# Patient Record
Sex: MALE | Race: WHITE | Employment: FULL TIME | URBAN - METROPOLITAN AREA
[De-identification: names, ages, dates, MRNs, and addresses within clinical notes are randomized per-mention and may not be internally consistent; named-entity substitution may affect disease eponyms.]

---

## 2017-02-09 RX ORDER — AMIODARONE HYDROCHLORIDE 200 MG/1
TABLET ORAL
Qty: 90 TABLET | Refills: 2 | Status: SHIPPED | OUTPATIENT
Start: 2017-02-09 | End: 2017-04-25 | Stop reason: ALTCHOICE

## 2017-04-21 DIAGNOSIS — Z95.0 PRESENCE OF CARDIAC PACEMAKER: ICD-10-CM

## 2017-04-21 DIAGNOSIS — I25.10 CORONARY ARTERY DISEASE INVOLVING NATIVE CORONARY ARTERY OF NATIVE HEART WITHOUT ANGINA PECTORIS: ICD-10-CM

## 2017-04-21 DIAGNOSIS — E11.9 TYPE 2 DIABETES MELLITUS WITHOUT COMPLICATION, WITHOUT LONG-TERM CURRENT USE OF INSULIN (HCC): ICD-10-CM

## 2017-04-21 DIAGNOSIS — G47.30 SLEEP APNEA, UNSPECIFIED TYPE: ICD-10-CM

## 2017-04-21 DIAGNOSIS — I25.2 MI, OLD: ICD-10-CM

## 2017-04-21 DIAGNOSIS — I34.0 MITRAL VALVE INSUFFICIENCY, UNSPECIFIED ETIOLOGY: ICD-10-CM

## 2017-04-21 DIAGNOSIS — I10 ESSENTIAL HYPERTENSION: ICD-10-CM

## 2017-04-21 DIAGNOSIS — E78.00 PURE HYPERCHOLESTEROLEMIA: ICD-10-CM

## 2017-04-24 RX ORDER — SPIRONOLACTONE 25 MG/1
TABLET ORAL
Qty: 90 TABLET | Refills: 2 | Status: SHIPPED | OUTPATIENT
Start: 2017-04-24 | End: 2017-11-28 | Stop reason: SDUPTHER

## 2017-04-24 RX ORDER — ATENOLOL 25 MG/1
TABLET ORAL
Qty: 90 TABLET | Refills: 2 | Status: SHIPPED | OUTPATIENT
Start: 2017-04-24 | End: 2018-04-29 | Stop reason: SDUPTHER

## 2017-04-25 ENCOUNTER — OFFICE VISIT (OUTPATIENT)
Dept: CARDIOLOGY CLINIC | Age: 67
End: 2017-04-25
Payer: COMMERCIAL

## 2017-04-25 VITALS
HEART RATE: 66 BPM | BODY MASS INDEX: 43.79 KG/M2 | OXYGEN SATURATION: 96 % | DIASTOLIC BLOOD PRESSURE: 70 MMHG | WEIGHT: 288 LBS | SYSTOLIC BLOOD PRESSURE: 110 MMHG

## 2017-04-25 DIAGNOSIS — E11.9 TYPE 2 DIABETES MELLITUS WITHOUT COMPLICATION, WITHOUT LONG-TERM CURRENT USE OF INSULIN (HCC): ICD-10-CM

## 2017-04-25 DIAGNOSIS — E78.00 PURE HYPERCHOLESTEROLEMIA: ICD-10-CM

## 2017-04-25 DIAGNOSIS — I10 ESSENTIAL HYPERTENSION: ICD-10-CM

## 2017-04-25 DIAGNOSIS — Z95.0 PRESENCE OF CARDIAC PACEMAKER: ICD-10-CM

## 2017-04-25 DIAGNOSIS — I25.10 CORONARY ARTERY DISEASE INVOLVING NATIVE CORONARY ARTERY OF NATIVE HEART WITHOUT ANGINA PECTORIS: Primary | ICD-10-CM

## 2017-04-25 DIAGNOSIS — E55.9 VITAMIN D DEFICIENCY DISEASE: ICD-10-CM

## 2017-04-25 PROCEDURE — 99214 OFFICE O/P EST MOD 30 MIN: CPT | Performed by: INTERNAL MEDICINE

## 2017-04-25 PROCEDURE — 93289 INTERROG DEVICE EVAL HEART: CPT | Performed by: INTERNAL MEDICINE

## 2017-04-25 RX ORDER — VITAMIN B COMPLEX
TABLET ORAL
COMMUNITY
End: 2019-01-21 | Stop reason: ALTCHOICE

## 2017-04-25 RX ORDER — AMIODARONE HYDROCHLORIDE 200 MG/1
200 TABLET ORAL DAILY
COMMUNITY
End: 2017-11-28 | Stop reason: SDUPTHER

## 2017-08-09 ENCOUNTER — TELEPHONE (OUTPATIENT)
Dept: CARDIOLOGY CLINIC | Age: 67
End: 2017-08-09

## 2017-10-02 ENCOUNTER — TELEPHONE (OUTPATIENT)
Dept: CARDIOLOGY CLINIC | Age: 67
End: 2017-10-02

## 2017-10-02 NOTE — TELEPHONE ENCOUNTER
Pt was awakened on Friday, Sept 29 with ow BP & elevated HR  with slight chest pressure. Didn't present like his previous heart attack. Took 2 aspirins at onset and then took regulard meds. Would like you to tell Dr travis call him if he should do anything or if he should send reading on defibrilator.     9/29 5:45 a.m. 109/69                5:50 a.m. 109/79                6:25 a.m. 113/78         Took meds          7:15 a.m.  96/52       HR 60          7:41 a.m. 100/47      HR 63

## 2017-10-09 RX ORDER — CLOPIDOGREL BISULFATE 75 MG/1
TABLET ORAL
Qty: 90 TABLET | Refills: 2 | Status: SHIPPED | OUTPATIENT
Start: 2017-10-09 | End: 2018-05-13 | Stop reason: SDUPTHER

## 2017-10-09 RX ORDER — ATORVASTATIN CALCIUM 80 MG/1
TABLET, FILM COATED ORAL
Qty: 90 TABLET | Refills: 2 | Status: SHIPPED | OUTPATIENT
Start: 2017-10-09 | End: 2018-05-13 | Stop reason: SDUPTHER

## 2017-11-27 DIAGNOSIS — I10 ESSENTIAL HYPERTENSION: ICD-10-CM

## 2017-11-27 DIAGNOSIS — E11.9 TYPE 2 DIABETES MELLITUS WITHOUT COMPLICATION, WITHOUT LONG-TERM CURRENT USE OF INSULIN (HCC): ICD-10-CM

## 2017-11-27 DIAGNOSIS — E55.9 VITAMIN D DEFICIENCY DISEASE: ICD-10-CM

## 2017-11-27 DIAGNOSIS — I25.10 CORONARY ARTERY DISEASE INVOLVING NATIVE CORONARY ARTERY OF NATIVE HEART WITHOUT ANGINA PECTORIS: ICD-10-CM

## 2017-11-27 DIAGNOSIS — Z95.0 PRESENCE OF CARDIAC PACEMAKER: ICD-10-CM

## 2017-11-27 DIAGNOSIS — E78.00 PURE HYPERCHOLESTEROLEMIA: ICD-10-CM

## 2017-11-28 ENCOUNTER — HOSPITAL ENCOUNTER (OUTPATIENT)
Age: 67
Discharge: HOME | End: 2017-11-28
Payer: COMMERCIAL

## 2017-11-28 ENCOUNTER — OFFICE VISIT (OUTPATIENT)
Dept: CARDIOLOGY CLINIC | Age: 67
End: 2017-11-28
Payer: COMMERCIAL

## 2017-11-28 VITALS
HEART RATE: 67 BPM | BODY MASS INDEX: 43.94 KG/M2 | SYSTOLIC BLOOD PRESSURE: 140 MMHG | OXYGEN SATURATION: 98 % | WEIGHT: 289 LBS | DIASTOLIC BLOOD PRESSURE: 80 MMHG

## 2017-11-28 DIAGNOSIS — E78.00 PURE HYPERCHOLESTEROLEMIA: ICD-10-CM

## 2017-11-28 DIAGNOSIS — Z95.0 PRESENCE OF CARDIAC PACEMAKER: ICD-10-CM

## 2017-11-28 DIAGNOSIS — I25.10 CORONARY ARTERY DISEASE INVOLVING NATIVE CORONARY ARTERY OF NATIVE HEART WITHOUT ANGINA PECTORIS: ICD-10-CM

## 2017-11-28 DIAGNOSIS — E11.9 TYPE 2 DIABETES MELLITUS WITHOUT COMPLICATION, WITHOUT LONG-TERM CURRENT USE OF INSULIN (HCC): ICD-10-CM

## 2017-11-28 DIAGNOSIS — I10 ESSENTIAL HYPERTENSION: ICD-10-CM

## 2017-11-28 DIAGNOSIS — E55.9 VITAMIN D DEFICIENCY DISEASE: ICD-10-CM

## 2017-11-28 DIAGNOSIS — I50.9 CONGESTIVE HEART FAILURE, UNSPECIFIED CONGESTIVE HEART FAILURE CHRONICITY, UNSPECIFIED CONGESTIVE HEART FAILURE TYPE: Primary | ICD-10-CM

## 2017-11-28 LAB
EKG ATRIAL RATE: 61 BPM
EKG P AXIS: 62 DEGREES
EKG P-R INTERVAL: 180 MS
EKG Q-T INTERVAL: 480 MS
EKG QRS DURATION: 156 MS
EKG QTC CALCULATION (BAZETT): 483 MS
EKG R AXIS: 83 DEGREES
EKG T AXIS: -74 DEGREES
EKG VENTRICULAR RATE: 61 BPM

## 2017-11-28 PROCEDURE — 99214 OFFICE O/P EST MOD 30 MIN: CPT | Performed by: INTERNAL MEDICINE

## 2017-11-28 PROCEDURE — 93005 ELECTROCARDIOGRAM TRACING: CPT

## 2017-11-28 RX ORDER — SILDENAFIL 100 MG/1
100 TABLET, FILM COATED ORAL PRN
Qty: 10 TABLET | Refills: 5 | Status: SHIPPED | OUTPATIENT
Start: 2017-11-28 | End: 2021-08-10 | Stop reason: SDUPTHER

## 2017-11-28 RX ORDER — SPIRONOLACTONE 25 MG/1
TABLET ORAL
Qty: 90 TABLET | Refills: 3 | Status: SHIPPED | OUTPATIENT
Start: 2017-11-28 | End: 2018-09-28 | Stop reason: SDUPTHER

## 2017-11-28 RX ORDER — AMIODARONE HYDROCHLORIDE 200 MG/1
200 TABLET ORAL DAILY
Qty: 90 TABLET | Refills: 3 | Status: SHIPPED | OUTPATIENT
Start: 2017-11-28 | End: 2018-11-23 | Stop reason: SDUPTHER

## 2017-11-28 NOTE — PROGRESS NOTES
Ov DR Shaka Puentes 6 mth follow up  Sob bending over no change  No chest pain  No hospitalizations or procedures   Since seen  ICD  checked per medtronic  Today  No episodes since episode   Sept 29th called in on oct 2 with  bp and hr.    Appointed  again this year for   6 year term  Last term unable to do after age 78  Bedside echo done  Will see in 6 mths

## 2017-12-05 NOTE — PROGRESS NOTES
his amiodarone to 6 tablets weekly rather than 5 tablets weekly. We interrogated his ICD again today and he has had no further episodes of ventricular tachycardia, with no interventions done since I last saw him. He continues to have shortness of breath, which is felt to be deconditioning as well as his COPD. He has had no syncope or near syncope. No lightheadedness or dizziness. He denies any exertional chest pain. He uses a treadmill several times per week. He is pleased that he was re-elected as  for _____ [place] in 2017, which will be his last term as he turns 69 within the next 5 years. CARDIAC RISK FACTORS:  Known CAD:  Positive. Hypertension:  Positive. Hyperlipidemia:  Positive. Peripheral Vascular Disease:  Negative. Smoking:  Negative. Diabetes:  Positive. Other Family Members:  Positive. MEDICATIONS AT HOME:  He is currently on Cordarone 200 mg 6 days per week, aspirin 81 mg daily, Tenormin 25 mg half a tablet daily, Lipitor 80 mg daily, Plavix 75 mg daily, CoQ10 daily, Advair 1 puff q. 12 hours, Amaryl 1 mg b.i.d., fish oil 1000 mg daily, he takes Altace 2.5 mg daily, Aldactone 25 mg daily, Ellipta 1 puff daily, and vitamin D 1000 mg daily. PAST MEDICAL HISTORY:  Type 2 diabetes; severe sleep apnea, on a CPAP mask; nephrolithiasis; hypertension; hyperlipidemia; mitral regurgitation, EF was then in the 40% range. FAMILY HISTORY:  Mother  at 68 of Alzheimer's. Father  of MI in an accident. SOCIAL HISTORY:  He is 75years old. He is a  in [place], re-elected in 2017, which will be his last term as he cannot be re-elected after he is 78years old. . He has 2 daughters, who live in [place]. He is exercising on a treadmill 1 time per week. He has a 32-foot boat, named [employer], which is in [place] with a boat condo for the winter. He goes up once or twice a month.   He plans on retiring after his  job is done in  of life, with approximately 9.8 years left on a battery. It was implanted. Generator change was done on 12/30/2014, which is an Evera XT. His lower rate was set at 50 in VVI mode. He has had no therapies and no nonsustained ventricular tachycardia. He is pacing 16% of the time. An echocardiogram on 09/20/2016, showed an EF of 40%, with normal right ventricular size and function done at [facility]. IMPRESSION:  1. Severe CAD. 2.  Anterior myocardial infarction in 10/1999, with catheterization showing triple-vessel disease. 3.  Open heart surgery by Dr. Gary Cummings on 12/25/1999, with a radial bypass graft to the PDA and posterior ventricular branch of the right coronary artery, a free right internal mammary artery bypass graft to the diagonal and 2 branches of the circumflex and a left internal mammary to the LAD. 4.  Catheterization on 11/02/2007, after an abnormal stress test, has showed a patent LIMA to the LAD, a patent right internal mammary artery to the diagonal and 2 branches of the circumflex, with an occluded radial bypass graft to the 2 branches to the right coronary artery, with an occluded native right coronary artery, EF of 30% to 35%. 5.  ICD implanted on 11/26/2007, by Dr. Troy Michelle. 6.  Sustained ventricular tachycardia on 12/02/2014, with recommendation to be on long-term amiodarone by Dr. Jann Gamble. 7.  ICD changed on 12/2014, placing a Medtronic Evera XT ICD by Dr. Jann Gamble. 8.  Repeat catheterization by myself on 03/04/2015, showed an occluded radial artery bypass graft to the right coronary artery, with an occluded native right coronary artery. He had a patent left internal mammary to the LAD, and a patent free right internal mammary artery bypass graft to the diagonal and 2 branches of the circumflex, with an EF of 30% to 35%, medical therapy recommended. 9.  EF of 40% by an echocardiogram on 04/24/2017. 10. Moderate COPD. 11.  Hyperlipidemia, well controlled.   12.

## 2018-03-01 RX ORDER — RAMIPRIL 2.5 MG/1
2.5 CAPSULE ORAL DAILY
Qty: 30 CAPSULE | Refills: 3 | Status: SHIPPED | OUTPATIENT
Start: 2018-03-01 | End: 2019-12-11 | Stop reason: SDUPTHER

## 2018-03-01 RX ORDER — RAMIPRIL 2.5 MG/1
2.5 CAPSULE ORAL DAILY
Qty: 90 CAPSULE | Refills: 3 | Status: SHIPPED | OUTPATIENT
Start: 2018-03-01 | End: 2018-03-01 | Stop reason: SDUPTHER

## 2018-03-01 NOTE — TELEPHONE ENCOUNTER
Needs to stay on the Ramipril 2.5mg qd and not  The 5mg qod.  Per Dr. Rosalind Rojas  90/3 to express  30/3 to ra/ [address]

## 2018-03-28 ENCOUNTER — PROCEDURE VISIT (OUTPATIENT)
Dept: CARDIOLOGY CLINIC | Age: 68
End: 2018-03-28
Payer: COMMERCIAL

## 2018-03-28 DIAGNOSIS — Z95.0 PRESENCE OF CARDIAC PACEMAKER: ICD-10-CM

## 2018-03-28 PROCEDURE — 93294 REM INTERROG EVL PM/LDLS PM: CPT | Performed by: INTERNAL MEDICINE

## 2018-03-28 PROCEDURE — 93296 REM INTERROG EVL PM/IDS: CPT | Performed by: INTERNAL MEDICINE

## 2018-03-28 NOTE — PROGRESS NOTES
Subjective:      Patient ID: Denys Baeza is a 75 y.o. male. HPI    Review of Systems    Objective:   Physical Exam    Assessment:          Plan:     Denys Christopher. sent in medtronic carelink pacemaker report. Reviewed by myself and Dr Josiah Frederick.

## 2018-04-30 RX ORDER — ATENOLOL 25 MG/1
TABLET ORAL
Qty: 90 TABLET | Refills: 2 | Status: SHIPPED | OUTPATIENT
Start: 2018-04-30 | End: 2019-01-21 | Stop reason: ALTCHOICE

## 2018-05-14 RX ORDER — CLOPIDOGREL BISULFATE 75 MG/1
TABLET ORAL
Qty: 90 TABLET | Refills: 2 | Status: SHIPPED | OUTPATIENT
Start: 2018-05-14 | End: 2018-08-10 | Stop reason: ALTCHOICE

## 2018-05-14 RX ORDER — ATORVASTATIN CALCIUM 80 MG/1
TABLET, FILM COATED ORAL
Qty: 90 TABLET | Refills: 2 | Status: SHIPPED | OUTPATIENT
Start: 2018-05-14 | End: 2019-02-24 | Stop reason: SDUPTHER

## 2018-06-28 PROCEDURE — 93295 DEV INTERROG REMOTE 1/2/MLT: CPT | Performed by: INTERNAL MEDICINE

## 2018-06-28 PROCEDURE — 93296 REM INTERROG EVL PM/IDS: CPT | Performed by: INTERNAL MEDICINE

## 2018-07-05 ENCOUNTER — PROCEDURE VISIT (OUTPATIENT)
Dept: CARDIOLOGY CLINIC | Age: 68
End: 2018-07-05
Payer: COMMERCIAL

## 2018-07-05 DIAGNOSIS — Z95.0 PRESENCE OF CARDIAC PACEMAKER: ICD-10-CM

## 2018-08-09 ENCOUNTER — HOSPITAL ENCOUNTER (OUTPATIENT)
Dept: GENERAL RADIOLOGY | Age: 68
Discharge: HOME | End: 2018-08-11
Payer: COMMERCIAL

## 2018-08-09 ENCOUNTER — HOSPITAL ENCOUNTER (OUTPATIENT)
Age: 68
Discharge: HOME | End: 2018-08-09
Payer: COMMERCIAL

## 2018-08-09 ENCOUNTER — OFFICE VISIT (OUTPATIENT)
Dept: CARDIOLOGY CLINIC | Age: 68
End: 2018-08-09
Payer: COMMERCIAL

## 2018-08-09 ENCOUNTER — HOSPITAL ENCOUNTER (OUTPATIENT)
Age: 68
Discharge: HOME | End: 2018-08-11
Payer: COMMERCIAL

## 2018-08-09 VITALS
SYSTOLIC BLOOD PRESSURE: 140 MMHG | DIASTOLIC BLOOD PRESSURE: 70 MMHG | BODY MASS INDEX: 42.12 KG/M2 | WEIGHT: 277 LBS | OXYGEN SATURATION: 98 % | HEART RATE: 72 BPM

## 2018-08-09 DIAGNOSIS — I34.0 MITRAL VALVE INSUFFICIENCY, UNSPECIFIED ETIOLOGY: Primary | ICD-10-CM

## 2018-08-09 DIAGNOSIS — I25.10 CORONARY ARTERY DISEASE INVOLVING NATIVE CORONARY ARTERY OF NATIVE HEART WITHOUT ANGINA PECTORIS: ICD-10-CM

## 2018-08-09 DIAGNOSIS — E55.9 VITAMIN D DEFICIENCY DISEASE: ICD-10-CM

## 2018-08-09 DIAGNOSIS — E78.00 PURE HYPERCHOLESTEROLEMIA: ICD-10-CM

## 2018-08-09 DIAGNOSIS — I10 ESSENTIAL HYPERTENSION: ICD-10-CM

## 2018-08-09 DIAGNOSIS — E11.9 TYPE 2 DIABETES MELLITUS WITHOUT COMPLICATION, WITHOUT LONG-TERM CURRENT USE OF INSULIN (HCC): ICD-10-CM

## 2018-08-09 DIAGNOSIS — I48.92 ATRIAL FLUTTER, UNSPECIFIED TYPE (HCC): ICD-10-CM

## 2018-08-09 DIAGNOSIS — I50.9 CONGESTIVE HEART FAILURE (HCC): ICD-10-CM

## 2018-08-09 LAB
EKG ATRIAL RATE: 64 BPM
EKG P AXIS: -78 DEGREES
EKG Q-T INTERVAL: 438 MS
EKG QRS DURATION: 116 MS
EKG QTC CALCULATION (BAZETT): 473 MS
EKG R AXIS: 65 DEGREES
EKG T AXIS: -160 DEGREES
EKG VENTRICULAR RATE: 70 BPM

## 2018-08-09 PROCEDURE — 93005 ELECTROCARDIOGRAM TRACING: CPT

## 2018-08-09 PROCEDURE — 93225 XTRNL ECG REC<48 HRS REC: CPT

## 2018-08-09 PROCEDURE — 99214 OFFICE O/P EST MOD 30 MIN: CPT | Performed by: INTERNAL MEDICINE

## 2018-08-09 PROCEDURE — 71046 X-RAY EXAM CHEST 2 VIEWS: CPT

## 2018-08-09 RX ORDER — NITROGLYCERIN 0.4 MG/1
TABLET SUBLINGUAL
Qty: 25 TABLET | Refills: 3 | Status: SHIPPED | OUTPATIENT
Start: 2018-08-09 | End: 2019-01-21 | Stop reason: SDUPTHER

## 2018-08-10 DIAGNOSIS — I25.10 CORONARY ARTERY DISEASE INVOLVING NATIVE CORONARY ARTERY OF NATIVE HEART WITHOUT ANGINA PECTORIS: ICD-10-CM

## 2018-08-10 DIAGNOSIS — I10 ESSENTIAL HYPERTENSION: ICD-10-CM

## 2018-08-10 DIAGNOSIS — I48.92 ATRIAL FLUTTER, UNSPECIFIED TYPE (HCC): ICD-10-CM

## 2018-08-10 DIAGNOSIS — E55.9 VITAMIN D DEFICIENCY DISEASE: ICD-10-CM

## 2018-08-10 DIAGNOSIS — E11.9 TYPE 2 DIABETES MELLITUS WITHOUT COMPLICATION, WITHOUT LONG-TERM CURRENT USE OF INSULIN (HCC): ICD-10-CM

## 2018-08-10 DIAGNOSIS — I50.9 CONGESTIVE HEART FAILURE (HCC): ICD-10-CM

## 2018-08-10 DIAGNOSIS — I34.0 MITRAL VALVE INSUFFICIENCY, UNSPECIFIED ETIOLOGY: ICD-10-CM

## 2018-08-10 DIAGNOSIS — E78.00 PURE HYPERCHOLESTEROLEMIA: ICD-10-CM

## 2018-08-10 NOTE — PROGRESS NOTES
Angeles Winston M.D. [address] [place] [address] [address], [place] [address]  [phone]      2018      Kali YOVANA Dee, [address] [facility], [address] [place] [address]      RE:   Anthony Postal  :  1950      Dear Dr. Katie Dee:    CHIEF COMPLAINT:  1. Coronary artery disease. 2.  Ventricular tachycardia. HISTORY OF PRESENT ILLNESS:  I had the pleasure of seeing Mr. Jasmine in our office on 2018. He is a pleasant 79-year-old [facility] in [place], who has a history of severe coronary artery disease. On 10/09/1999, he had an ST elevation myocardial infarction, was given TPA, and a catheterization showed severe triple vessel disease. He had open heart surgery by Dr. Mami Fulton on 1999, with a radial bypass graft sequentially to the PDA and posterior ventricular branch to the right coronary artery, a free right internal mammary artery bypass to the diagonal, anterior branch of the circumflex and a left internal mammary artery to the LAD. A catheterization on 2007, showed widely patent bypass grafts with an occluded posterolateral right coronary artery, EF of 30% to 35% and a single lead ICD placed was on 2007, by Dr. Juani Dobson. On 2014, he had sustained ventricular tachycardia with a shock and it was recommended that he be on long-term amiodarone by Dr. Don Alvarez. His ICD was implanted on 2014, by Dr. Don Alvarez because of LYUDMILA. An Evera Medtronic XT single  was placed. I did a catheterization on 2015, that showed an occluded radial artery bypass graft to the right coronary artery with an occluded native right coronary artery. He had a patent LIMA to the LAD and a patent free right internal mammary artery bypass graft to the diagonal and 2 branches of the circumflex with an EF of 30% to 35% with medical therapy recommended.   His EF improved to 40% by an echocardiogram on 04/24/2017. His amiodarone is at 5 days per week. However, in 04/2017, he had multiple episodes of nonsustained ventricular tachycardia, and therefore, I increased his amiodarone to 6 days per week. I last saw him on 11/28/2017, and at that time he was doing well. He has had no further episodes of nonsustained ventricular tachycardia. On 04/05/2018, during an intimate encounter, he had sustained ventricular tachycardia with defibrillation of his ICD. He converted back to regular rhythm at that time. He has had no chest pain or chest discomfort. He has continued shortness of breath secondary to COPD and some deconditioning. He has had no syncope or near syncope. No lightheaded or dizziness. He has had no exertional chest pain. He feels his energy level is slightly less over the last several months. He overall has been feeling good except for slightly less energy. He has had no hospitalizations or procedures. We interrogated his pacemaker and confirmed that he had had ventricular tachycardia on 04/05/2018, with failed burst pacing and converted with 1 single ICD shock. CARDIAC RISK FACTORS:  Known CAD:  Positive. Hypertension:  Positive. Hyperlipidemia:  Positive. Peripheral Vascular Disease:  Negative. Smoking:  Negative. Diabetes:  Positive. Other Family Members:  Positive. MEDICATIONS AT HOME:  He is currently on amiodarone 200 mg 6 days a week with skipping Sunday, vitamin C 1000 mg daily, aspirin 81 mg daily, Tenormin 25 mg daily, Lipitor 80 mg daily, Plavix 75 mg daily, CoQ10 100 mg daily, Advair 250/50 mcg 1 puff q.12h., Amaryl 1 mg b.i.d., Claritin 10 mg daily, multivitamins daily, fish oil 1000 mg daily, Altace 2.5 mg daily, Viagra 100 mg p.r.n., Aldactone 25 mg daily, Incruse Ellipta 1 puff daily, and vitamin D 1000 units daily. PAST MEDICAL HISTORY:  1. Type 2 diabetes with a hemoglobin A1c of 6.7.  2.  He had severe sleep apnea on a CPAP mask.   3. murmur. PMI was normal.  No lift, thrust, or pericardial friction rub. LUNGS:  Quite clear to auscultation and percussion. ABDOMEN:  Soft and nontender. Good bowel sounds. The aorta was not enlarged. No hepatomegaly, splenomegaly. EXTREMITIES:  Good femoral pulses. Good pedal pulses. He had trace pedal edema. Skin was warm and dry. No calf tenderness. Nail beds pink. Good cap refill. PULSES:  Bilateral symmetrical radial, brachial and carotid pulses. Good femoral and pedal pulses. NEUROLOGIC EXAM:  Within normal limits. PSYCHIATRIC EXAM:  Within normal limits. LABORATORY DATA:  From 06/28/2018, his white count was 7.1, hemoglobin 16.1 with a platelet count of 569,895. Glucose was 77, BUN 22, creatinine 1.68 with a GFR of 41, which is about the same as it had been previously. Sodium 142, potassium 4.2, calcium 9.1. AST was 20, ALT was 19. Cholesterol 127 with a triglyceride of 99, HDL was 42, LDL 65, vitamin D was 43. TSH was elevated at 15.98 with a free T4 of 1.47, magnesium was 2.2. An EKG showed what appeared to be a slow atrial flutter with fairly big regular ventricular response. It was definitely changed from his previous EKG where he was in a sinus rhythm. We repeated his EKG with a rhythm strip and indeed it did appear to show slow atrial flutter with a variable ventricular delay with an overall heart rate in the 70 beats per minute range. Bedside echocardiogram showed an EF in the 40% range with mildly dilated right-sided chambers, which we would expect with sleep apnea. IMPRESSION:  1. Hypothyroidism with a TSH of 15, most likely secondary to his amiodarone. 2.  What appears to be slow atrial flutter with a controlled ventricular response, which is new since his last EKG where he was in sinus rhythm. 3.  Severe coronary artery disease with an anterior myocardial infarction on 10/19/1999, with a catheterization showing triple vessel disease.   4.  Open heart surgery by atrial flutter, then would anticoagulate for 3 weeks and plan on a cardioversion after 3 weeks of anticoagulation. 5.  Would then consider EP evaluation with possible ablation if appropriate. 6.  Have him see Dr. Nathan Chadwick for his hypothyroidism for thyroid replacement. 7.  We will continue his amiodarone at the same dose in view of his hypothyroidism. DISCUSSION: Mr. Ltanya Boxer overall clinically is doing well. He has had no chest pain or chest discomfort, or any unusual shortness of breath. He has had mild loss of energy. He did have a ventricular tachycardia on 04/05/2018 and was defibrillated with his ICD. He has had no further episodes of ventricular tachycardia. His rhythm has changed. He appears to be in a slow atrial flutter with a controlled ventricular response. This is a definite change from his last EKG which showed sinus rhythm. Obviously, we do not know how long he has been in atrial flutter. His ICD is a single ventricular lead and therefore we do not have atrial sensing. It was difficult to get a clear idea of his rhythm on EKG and I will do a 48-hour Holter to try to get a better idea of his rhythm and rate over 48 hours. At night, his rate should slow and we should be able to see more discrete flutter waves and get a clear idea of the rate of his flutter waves. His MENDEL score is 5 and there is a high recommendation for anticoagulation. I will give him samples of a NOAC. We had Xarelto which we gave him at 20 mg daily. His renal function is good enough that he does not need to reduce dose. He does appear to have hypothyroidism. His TSH was 6.98 when we checked in 10/2017. It has increased to 15 at this time. He will call Dr. Nathan Chadwick tomorrow for recommendation concerning thyroid replacement. We will draw another TSH tomorrow. He has had one episode of ventricular tachycardia requiring cardioversion on 04/05/2018.   I will not change his dose of amiodarone based on

## 2018-09-10 ENCOUNTER — TELEPHONE (OUTPATIENT)
Dept: CARDIOLOGY CLINIC | Age: 68
End: 2018-09-10

## 2018-09-10 ENCOUNTER — HOSPITAL ENCOUNTER (OUTPATIENT)
Age: 68
Discharge: HOME | End: 2018-09-10
Payer: COMMERCIAL

## 2018-09-10 ENCOUNTER — HOSPITAL ENCOUNTER (OUTPATIENT)
Dept: NON INVASIVE DIAGNOSTICS | Age: 68
Discharge: HOME | End: 2018-09-10
Payer: COMMERCIAL

## 2018-09-10 ENCOUNTER — OFFICE VISIT (OUTPATIENT)
Dept: CARDIOLOGY CLINIC | Age: 68
End: 2018-09-10
Payer: COMMERCIAL

## 2018-09-10 VITALS
WEIGHT: 282 LBS | BODY MASS INDEX: 42.88 KG/M2 | SYSTOLIC BLOOD PRESSURE: 101 MMHG | RESPIRATION RATE: 20 BRPM | HEART RATE: 65 BPM | OXYGEN SATURATION: 99 % | DIASTOLIC BLOOD PRESSURE: 59 MMHG

## 2018-09-10 DIAGNOSIS — I48.92 ATRIAL FLUTTER, UNSPECIFIED TYPE (HCC): Primary | ICD-10-CM

## 2018-09-10 DIAGNOSIS — E11.9 TYPE 2 DIABETES MELLITUS WITHOUT COMPLICATION, WITHOUT LONG-TERM CURRENT USE OF INSULIN (HCC): ICD-10-CM

## 2018-09-10 DIAGNOSIS — I10 ESSENTIAL HYPERTENSION: ICD-10-CM

## 2018-09-10 DIAGNOSIS — I48.91 ATRIAL FIBRILLATION, CONTROLLED (HCC): Primary | ICD-10-CM

## 2018-09-10 DIAGNOSIS — I48.91 ATRIAL FIBRILLATION, CONTROLLED (HCC): ICD-10-CM

## 2018-09-10 DIAGNOSIS — E78.00 PURE HYPERCHOLESTEROLEMIA: ICD-10-CM

## 2018-09-10 DIAGNOSIS — I25.10 CORONARY ARTERY DISEASE INVOLVING NATIVE CORONARY ARTERY OF NATIVE HEART WITHOUT ANGINA PECTORIS: ICD-10-CM

## 2018-09-10 DIAGNOSIS — I48.92 ATRIAL FLUTTER, UNSPECIFIED TYPE (HCC): ICD-10-CM

## 2018-09-10 DIAGNOSIS — E55.9 VITAMIN D DEFICIENCY DISEASE: ICD-10-CM

## 2018-09-10 DIAGNOSIS — I34.0 MITRAL VALVE INSUFFICIENCY, UNSPECIFIED ETIOLOGY: ICD-10-CM

## 2018-09-10 LAB
ABSOLUTE EOS #: 0.1 K/UL (ref 0–0.4)
ABSOLUTE IMMATURE GRANULOCYTE: ABNORMAL K/UL (ref 0–0.3)
ABSOLUTE LYMPH #: 1.3 K/UL (ref 1–4.8)
ABSOLUTE MONO #: 0.7 K/UL (ref 0–1)
ALBUMIN SERPL-MCNC: 4.3 G/DL (ref 3.5–5.2)
ALBUMIN/GLOBULIN RATIO: ABNORMAL (ref 1–2.5)
ALP BLD-CCNC: 72 U/L (ref 40–129)
ALT SERPL-CCNC: 29 U/L (ref 5–41)
ANION GAP SERPL CALCULATED.3IONS-SCNC: 11 MMOL/L (ref 9–17)
AST SERPL-CCNC: 24 U/L
BASOPHILS # BLD: 1 % (ref 0–2)
BASOPHILS ABSOLUTE: 0 K/UL (ref 0–0.2)
BILIRUB SERPL-MCNC: 0.88 MG/DL (ref 0.3–1.2)
BUN BLDV-MCNC: 24 MG/DL (ref 8–23)
BUN/CREAT BLD: 16 (ref 9–20)
CALCIUM SERPL-MCNC: 9.1 MG/DL (ref 8.6–10.4)
CHLORIDE BLD-SCNC: 102 MMOL/L (ref 98–107)
CO2: 26 MMOL/L (ref 20–31)
CREAT SERPL-MCNC: 1.49 MG/DL (ref 0.7–1.2)
DIFFERENTIAL TYPE: YES
EKG ATRIAL RATE: 178 BPM
EKG Q-T INTERVAL: 424 MS
EKG QRS DURATION: 120 MS
EKG QTC CALCULATION (BAZETT): 495 MS
EKG R AXIS: 66 DEGREES
EKG T AXIS: -145 DEGREES
EKG VENTRICULAR RATE: 82 BPM
EOSINOPHILS RELATIVE PERCENT: 2 % (ref 0–5)
GFR AFRICAN AMERICAN: 57 ML/MIN
GFR NON-AFRICAN AMERICAN: 47 ML/MIN
GFR SERPL CREATININE-BSD FRML MDRD: ABNORMAL ML/MIN/{1.73_M2}
GFR SERPL CREATININE-BSD FRML MDRD: ABNORMAL ML/MIN/{1.73_M2}
GLUCOSE BLD-MCNC: 114 MG/DL (ref 70–99)
HCT VFR BLD CALC: 46 % (ref 41–53)
HEMOGLOBIN: 15 G/DL (ref 13.5–17.5)
IMMATURE GRANULOCYTES: ABNORMAL %
LYMPHOCYTES # BLD: 17 % (ref 13–44)
MCH RBC QN AUTO: 31.6 PG (ref 26–34)
MCHC RBC AUTO-ENTMCNC: 32.5 G/DL (ref 31–37)
MCV RBC AUTO: 97 FL (ref 80–100)
MONOCYTES # BLD: 10 % (ref 5–9)
NRBC AUTOMATED: ABNORMAL PER 100 WBC
PDW BLD-RTO: 15 % (ref 12.1–15.2)
PLATELET # BLD: 198 K/UL (ref 140–450)
PLATELET ESTIMATE: ABNORMAL
PMV BLD AUTO: ABNORMAL FL (ref 6–12)
POTASSIUM SERPL-SCNC: 4.4 MMOL/L (ref 3.7–5.3)
RBC # BLD: 4.74 M/UL (ref 4.5–5.9)
RBC # BLD: ABNORMAL 10*6/UL
SEG NEUTROPHILS: 70 % (ref 39–75)
SEGMENTED NEUTROPHILS ABSOLUTE COUNT: 5.1 K/UL (ref 2.1–6.5)
SODIUM BLD-SCNC: 139 MMOL/L (ref 135–144)
THYROXINE, FREE: 1.32 NG/DL (ref 0.93–1.7)
TOTAL PROTEIN: 6.8 G/DL (ref 6.4–8.3)
TSH SERPL DL<=0.05 MIU/L-ACNC: 8.35 MIU/L (ref 0.3–5)
WBC # BLD: 7.3 K/UL (ref 3.5–11)
WBC # BLD: ABNORMAL 10*3/UL

## 2018-09-10 PROCEDURE — 92960 CARDIOVERSION ELECTRIC EXT: CPT

## 2018-09-10 PROCEDURE — 93005 ELECTROCARDIOGRAM TRACING: CPT

## 2018-09-10 PROCEDURE — 2580000003 HC RX 258: Performed by: INTERNAL MEDICINE

## 2018-09-10 PROCEDURE — 84439 ASSAY OF FREE THYROXINE: CPT

## 2018-09-10 PROCEDURE — 85025 COMPLETE CBC W/AUTO DIFF WBC: CPT

## 2018-09-10 PROCEDURE — 36415 COLL VENOUS BLD VENIPUNCTURE: CPT

## 2018-09-10 PROCEDURE — 6360000002 HC RX W HCPCS: Performed by: INTERNAL MEDICINE

## 2018-09-10 PROCEDURE — 84443 ASSAY THYROID STIM HORMONE: CPT

## 2018-09-10 PROCEDURE — 80053 COMPREHEN METABOLIC PANEL: CPT

## 2018-09-10 PROCEDURE — 99212 OFFICE O/P EST SF 10 MIN: CPT | Performed by: INTERNAL MEDICINE

## 2018-09-10 RX ORDER — SODIUM CHLORIDE 0.9 % (FLUSH) 0.9 %
10 SYRINGE (ML) INJECTION PRN
Status: DISCONTINUED | OUTPATIENT
Start: 2018-09-11 | End: 2018-09-11 | Stop reason: HOSPADM

## 2018-09-10 RX ORDER — PROPOFOL 10 MG/ML
60 INJECTION, EMULSION INTRAVENOUS EVERY 5 MIN PRN
Status: DISCONTINUED | OUTPATIENT
Start: 2018-09-10 | End: 2018-09-11 | Stop reason: HOSPADM

## 2018-09-10 RX ORDER — PROPOFOL 10 MG/ML
60 INJECTION, EMULSION INTRAVENOUS ONCE
Status: DISCONTINUED | OUTPATIENT
Start: 2018-09-10 | End: 2018-09-11 | Stop reason: HOSPADM

## 2018-09-10 RX ORDER — PROPOFOL 10 MG/ML
127 INJECTION, EMULSION INTRAVENOUS ONCE
Status: DISCONTINUED | OUTPATIENT
Start: 2018-09-10 | End: 2018-09-11 | Stop reason: HOSPADM

## 2018-09-10 RX ORDER — SODIUM CHLORIDE 450 MG/100ML
INJECTION, SOLUTION INTRAVENOUS CONTINUOUS
Status: DISCONTINUED | OUTPATIENT
Start: 2018-09-10 | End: 2018-09-11 | Stop reason: HOSPADM

## 2018-09-10 RX ADMIN — PROPOFOL 100 MG: 10 INJECTION, EMULSION INTRAVENOUS at 16:02

## 2018-09-10 RX ADMIN — SODIUM CHLORIDE: 4.5 INJECTION, SOLUTION INTRAVENOUS at 15:55

## 2018-09-10 NOTE — PROGRESS NOTES
accommodation. Extraocular movements were intact. Mucous membranes were dry. NECK:  No JVD. Good carotid pulses. No carotid bruits. No lymphadenopathy or thyromegaly. CARDIOVASCULAR EXAM:  S1 and S2 were normal.  No S3 or S4. Soft systolic blowing type murmur. No diastolic murmur. PMI was normal.  No lift, thrust, or pericardial friction rub. LUNGS:  Quite clear to auscultation and percussion. ABDOMEN:  Soft and nontender. Good bowel sounds. The aorta was not enlarged. No hepatomegaly, splenomegaly. EXTREMITIES:  Good femoral pulses. Good pedal pulses. He had trace pedal edema. Skin was warm and dry. No calf tenderness. Nail beds pink. Good cap refill. PULSES:  Bilateral symmetrical radial, brachial and carotid pulses. Good femoral and pedal pulses. NEUROLOGIC EXAM:  Within normal limits. PSYCHIATRIC EXAM:  Within normal limits. LABORATORY DATA:  From 06/28/2018, his white count was 7.1, hemoglobin 16.1 with a platelet count of 908,900. Glucose was 77, BUN 22, creatinine 1.68 with a GFR of 41, which is about the same as it had been previously. Sodium 142, potassium 4.2, calcium 9.1. AST was 20, ALT was 19. Cholesterol 127 with a triglyceride of 99, HDL was 42, LDL 65, vitamin D was 43. TSH was elevated at 15.98 with a free T4 of 1.47, magnesium was 2.2. An EKG showed what appeared to be a slow atrial flutter with fairly big regular ventricular response. It was definitely changed from his previous EKG where he was in a sinus rhythm. We repeated his EKG with a rhythm strip and indeed it did appear to show slow atrial flutter with a variable ventricular delay with an overall heart rate in the 70 beats per minute range. Bedside echocardiogram showed an EF in the 40% range with mildly dilated right-sided chambers, which we would expect with sleep apnea. IMPRESSION:  1. Hypothyroidism with a TSH of 15, most likely secondary to his amiodarone.   2.  Slow atrial flutter with a cardioversion. Thank you very much for allowing me the privilege of seeing Mr. Jasmine. Any questions on my thoughts, please do not hesitate to contact me.     Sincerely,        Cruz Else

## 2018-09-10 NOTE — PROGRESS NOTES
1616.  Patient talking with Dr. Netta Baker. 1645   Patient talking. Skin warm and dry. Respirations even and unlabored.

## 2018-09-10 NOTE — PRE SEDATION
CAPS Take by mouth   Yes Historical Provider, MD   Umeclidinium Bromide (INCRUSE ELLIPTA) 62.5 MCG/INH AEPB Inhale 1 puff into the lungs daily   Yes Historical Provider, MD   glimepiride (AMARYL) 1 MG tablet Take 1 mg by mouth 2 times daily    Yes Historical Provider, MD   fluticasone-salmeterol (ADVAIR) 250-50 MCG/DOSE AEPB Inhale 1 puff into the lungs every 12 hours   Yes Historical Provider, MD   vitamin D (CHOLECALCIFEROL) 1000 UNIT TABS tablet Take 1,000 Units by mouth daily. Yes Historical Provider, MD   loratadine (CLARITIN REDITABS) 10 MG dissolvable tablet Take 10 mg by mouth daily. Yes Historical Provider, MD   Garlic 5186 MG CAPS Take  by mouth. Yes Historical Provider, MD   Multiple Vitamins-Minerals (THERAPEUTIC MULTIVITAMIN-MINERALS) tablet Take 1 tablet by mouth daily. Yes Historical Provider, MD   Omega-3 Fatty Acids (FISH OIL) 1000 MG CPDR Take  by mouth. Yes Historical Provider, MD   aspirin 81 MG tablet Take 81 mg by mouth daily. Yes Historical Provider, MD   nitroGLYCERIN (NITROSTAT) 0.4 MG SL tablet up to max of 3 total doses. If no relief after 1 dose, call 911. 8/9/18   Patricia Hogan MD   sildenafil (VIAGRA) 100 MG tablet Take 1 tablet by mouth as needed for Erectile Dysfunction 11/28/17   Patricia Hogan MD   nitroGLYCERIN (NITROSTAT) 0.4 MG SL tablet Place 0.4 mg under the tongue every 5 minutes as needed for Chest pain    Historical Provider, MD   Ascorbic Acid (VITAMIN C) 500 MG tablet Take 1,000 mg by mouth daily     Historical Provider, MD     Coumadin Use Last 7 Days:  no    Antiplatelet drug therapy use last 7 days: yes -Other anticoagulant use last 7 days: yes -   Additional Medication Information:       Pre-Sedation Documentation and Exam:   I have personally completed a history, physical exam & review of systems for this patient (see notes).     Mallampati Airway Assessment:  Mallampati Class I - (soft palate, fauces, uvula & anterior/posterior tonsillar pillars are

## 2018-09-11 LAB
EKG ATRIAL RATE: 63 BPM
EKG ATRIAL RATE: 85 BPM
EKG P AXIS: 58 DEGREES
EKG P-R INTERVAL: 180 MS
EKG Q-T INTERVAL: 412 MS
EKG Q-T INTERVAL: 450 MS
EKG QRS DURATION: 120 MS
EKG QRS DURATION: 166 MS
EKG QTC CALCULATION (BAZETT): 460 MS
EKG QTC CALCULATION (BAZETT): 484 MS
EKG R AXIS: 74 DEGREES
EKG R AXIS: 78 DEGREES
EKG T AXIS: -106 DEGREES
EKG T AXIS: -167 DEGREES
EKG VENTRICULAR RATE: 63 BPM
EKG VENTRICULAR RATE: 83 BPM

## 2018-09-13 NOTE — PROCEDURES
[facility]                              [address] [place] [address]                              CARDIAC CATHETERIZATION    PATIENT NAME: Epifanio Paulson                        :        1950  MED REC NO:   [id number]                              ROOM:  ACCOUNT NO:   [de-identified]                           ADMIT DATE: 09/10/2018  PROVIDER:     Jeronimo Franks    DATE OF PROCEDURE:  09/10/2018    NAME OF THE TEST:  Cardioversion from atrial flutter to normal sinus  rhythm. INDICATION:  Atrial flutter. Had him anticoagulated for 3 weeks. PROCEDURE:  We gave him propofol intravenously. Respiratory therapy and 2  RNs were assisting. After he was asleep, I gave him a single 30-joules  synchronized shock. He converted immediately to sinus rhythm at a rate of  50 beats per minute. He awoke without difficulty with no complications. IMPRESSION:  Successful cardioversion from atrial flutter to normal sinus  rhythm with 30 joules. DISCUSSION:  He is already on amiodarone for ventricular arrhythmias. Therefore, I will continue with same medications. He was complaining of  marked fatigue and I will see if he is symptomatically better in sinus  rhythm. If he is better in sinus rhythm, we will follow him clinically. If he would redevelop atrial flutter again then he would be a candidate for  atrial flutter ablation. At this point, if he is controlled and remains in  sinus rhythm with amiodarone then I would not send him for ablation. We  will continue anticoagulation indefinitely. Omar Mosquera    D: 2018 23:50:40       T: 2018 1:27:08     [id number]  Job#: [id number]     Doc#: [id number]    CC:  Kali Reyes

## 2018-09-28 RX ORDER — SPIRONOLACTONE 25 MG/1
TABLET ORAL
Qty: 90 TABLET | Refills: 3 | Status: SHIPPED | OUTPATIENT
Start: 2018-09-28 | End: 2020-03-09 | Stop reason: SDUPTHER

## 2018-10-08 ENCOUNTER — TELEPHONE (OUTPATIENT)
Dept: CARDIOLOGY CLINIC | Age: 68
End: 2018-10-08

## 2018-10-16 ENCOUNTER — OFFICE VISIT (OUTPATIENT)
Dept: CARDIOLOGY CLINIC | Age: 68
End: 2018-10-16
Payer: COMMERCIAL

## 2018-10-16 ENCOUNTER — HOSPITAL ENCOUNTER (OUTPATIENT)
Age: 68
Discharge: HOME | End: 2018-10-16
Payer: COMMERCIAL

## 2018-10-16 VITALS
OXYGEN SATURATION: 98 % | BODY MASS INDEX: 43.49 KG/M2 | SYSTOLIC BLOOD PRESSURE: 124 MMHG | HEART RATE: 60 BPM | WEIGHT: 286 LBS | DIASTOLIC BLOOD PRESSURE: 60 MMHG

## 2018-10-16 DIAGNOSIS — I10 ESSENTIAL HYPERTENSION: ICD-10-CM

## 2018-10-16 DIAGNOSIS — I34.0 MITRAL VALVE INSUFFICIENCY, UNSPECIFIED ETIOLOGY: ICD-10-CM

## 2018-10-16 DIAGNOSIS — E11.9 TYPE 2 DIABETES MELLITUS WITHOUT COMPLICATION, WITHOUT LONG-TERM CURRENT USE OF INSULIN (HCC): ICD-10-CM

## 2018-10-16 DIAGNOSIS — I25.10 CORONARY ARTERY DISEASE INVOLVING NATIVE CORONARY ARTERY OF NATIVE HEART WITHOUT ANGINA PECTORIS: ICD-10-CM

## 2018-10-16 DIAGNOSIS — E55.9 VITAMIN D DEFICIENCY DISEASE: ICD-10-CM

## 2018-10-16 DIAGNOSIS — E78.00 PURE HYPERCHOLESTEROLEMIA: ICD-10-CM

## 2018-10-16 DIAGNOSIS — I48.92 ATRIAL FLUTTER, UNSPECIFIED TYPE (HCC): ICD-10-CM

## 2018-10-16 LAB
EKG ATRIAL RATE: 54 BPM
EKG P AXIS: 40 DEGREES
EKG P-R INTERVAL: 186 MS
EKG Q-T INTERVAL: 482 MS
EKG QRS DURATION: 112 MS
EKG QTC CALCULATION (BAZETT): 457 MS
EKG R AXIS: 90 DEGREES
EKG T AXIS: -85 DEGREES
EKG VENTRICULAR RATE: 54 BPM

## 2018-10-16 PROCEDURE — 93005 ELECTROCARDIOGRAM TRACING: CPT

## 2018-10-16 PROCEDURE — 99212 OFFICE O/P EST SF 10 MIN: CPT | Performed by: INTERNAL MEDICINE

## 2018-10-29 NOTE — PROGRESS NOTES
2.5 mg daily, Xarelto 20 mg daily, Viagra 100 mg p.r.n., Aldactone 25 mg daily, Incruse Ellipta 1 puff daily, vitamin D 1000 units daily. PHYSICAL EXAMINATION:  VITAL SIGNS:  His blood pressure today was 120/70 with a heart rate of 60 and regular. Respiratory rate 18. GENERAL:  He is a pleasant 79-year-old gentleman. He denied pain. He was oriented to person, place and time. Answered questions appropriately. SKIN:  No unusual skin changes. HEENT:  Unremarkable. NECK:  No JVD. Good carotid pulses. No carotid bruits. CARDIOVASCULAR EXAM:  S1 and S2 were normal.  No S3 or S4.  LUNGS:  Quite clear to auscultation and percussion. ABDOMEN:  Soft and nontender. Good bowel sounds. The aorta was not enlarged. No hepatomegaly, splenomegaly. EXTREMITIES:  Good femoral pulses. Good pedal pulses. No pedal edema, although he just returned from a 10-hour driving trip from [facility]. DISCUSSION:  At this point, he is doing well. He is tolerating his NOAC and he has remained in sinus rhythm thus far. He is currently on amiodarone 6 days a week for his ventricular tachycardia. We have a return appointment in February. I made no other change in medications. At this point, he is stable. However, he is thinking about buying an iWatch to be able to monitor his rhythm at home, which should be an advantage. Thank you very much for allowing me the privilege of seeing Mr. Jasmine. If you have any questions on my thoughts, please do not hesitate to contact me.     Sincerely,        Ivelisse Morrow    D: 10/16/2018 16:15:38     T: 10/16/2018 23:28:23     [id number]  Job#: [id number]   Doc#: [id number]    CC:

## 2018-11-05 ENCOUNTER — TELEPHONE (OUTPATIENT)
Dept: CARDIOLOGY CLINIC | Age: 68
End: 2018-11-05

## 2018-11-05 DIAGNOSIS — I25.10 CORONARY ARTERY DISEASE INVOLVING NATIVE CORONARY ARTERY OF NATIVE HEART WITHOUT ANGINA PECTORIS: ICD-10-CM

## 2018-11-05 DIAGNOSIS — I10 ESSENTIAL HYPERTENSION: Primary | ICD-10-CM

## 2018-11-05 LAB
ALBUMIN SERPL-MCNC: 3.7 G/DL
ALP BLD-CCNC: 77 U/L
ALT SERPL-CCNC: 31 U/L
ANION GAP SERPL CALCULATED.3IONS-SCNC: ABNORMAL MMOL/L
AST SERPL-CCNC: 20 U/L
BASOPHILS ABSOLUTE: 0 /ΜL
BASOPHILS RELATIVE PERCENT: 0.5 %
BILIRUB SERPL-MCNC: 0.8 MG/DL (ref 0.1–1.4)
BUN BLDV-MCNC: 22 MG/DL
CALCIUM SERPL-MCNC: 8.8 MG/DL
CHLORIDE BLD-SCNC: 105 MMOL/L
CO2: 28 MMOL/L
CREAT SERPL-MCNC: 1.72 MG/DL
EOSINOPHILS ABSOLUTE: 0.1 /ΜL
EOSINOPHILS RELATIVE PERCENT: 1.2 %
GFR CALCULATED: 40
GLUCOSE BLD-MCNC: 185 MG/DL
HCT VFR BLD CALC: 44.6 % (ref 41–53)
HEMOGLOBIN: 14.6 G/DL (ref 13.5–17.5)
LYMPHOCYTES ABSOLUTE: 1.2 /ΜL
LYMPHOCYTES RELATIVE PERCENT: 17.7 %
MCH RBC QN AUTO: 31.8 PG
MCHC RBC AUTO-ENTMCNC: 32.7 G/DL
MCV RBC AUTO: 97.3 FL
MONOCYTES ABSOLUTE: 0.5 /ΜL
MONOCYTES RELATIVE PERCENT: 7.9 %
NEUTROPHILS ABSOLUTE: 4.7 /ΜL
NEUTROPHILS RELATIVE PERCENT: 72.7 %
PDW BLD-RTO: 14.6 %
PLATELET # BLD: 176 K/ΜL
PMV BLD AUTO: 9.5 FL
POTASSIUM SERPL-SCNC: 4.3 MMOL/L
RBC # BLD: 4.58 10^6/ΜL
SODIUM BLD-SCNC: 142 MMOL/L
TOTAL PROTEIN: 6.7
WBC # BLD: 6.5 10^3/ML

## 2018-11-05 NOTE — TELEPHONE ENCOUNTER
Friday & Saturday pt experience dizziness. bp 114/56 as high as 132/61. Sugars don't seem any different 130 before breakfast 300 after eating? Pt is asking if you would want him to go to [facility] for testing?     Please advise    Health Maintenance   Topic Date Due    AAA screen  1950    Hepatitis C screen  1950    Diabetic foot exam  06/22/1959    Diabetic retinal exam  06/22/1959    Diabetic microalbuminuria test  06/22/1967    DTaP/Tdap/Td vaccine (1 - Tdap) 06/22/1968    Shingles Vaccine (1 of 2 - 2 Dose Series) 06/22/1999    Colon cancer screen colonoscopy  06/22/1999    Pneumococcal low/med risk (1 of 2 - PCV13) 06/22/2014    A1C test (Diabetic or Prediabetic)  07/21/2015    Lipid screen  07/21/2015    Flu vaccine (1) 09/01/2018    TSH testing  09/10/2019    Potassium monitoring  09/10/2019    Creatinine monitoring  09/10/2019             (applicable per patient's age: Cancer Screenings, Depression Screening, Fall Risk Screening, Immunizations)    Hemoglobin A1C (%)   Date Value   07/21/2014 6.3 (H)     LDL Cholesterol (mg/dL)   Date Value   07/21/2014 62     AST (U/L)   Date Value   09/10/2018 24     ALT (U/L)   Date Value   09/10/2018 29     BUN (mg/dL)   Date Value   09/10/2018 24 (H)      (goal A1C is < 7)   (goal LDL is <100) need 30-50% reduction from baseline     BP Readings from Last 3 Encounters:   10/16/18 124/60   09/10/18 (!) 101/59   08/09/18 (!) 140/70    (goal /80)      All Future Testing planned in [facility]:  Lab Frequency Next Occurrence   TSH with Reflex Once 04/09/2019   CBC Auto Differential Once 04/09/2019   Comprehensive Metabolic Panel Once 50/85/3383   Vitamin D 25 Hydroxy Once 04/09/2019   Lipid Panel Once 04/09/2019   Magnesium Once 04/09/2019       Next Visit Date:  Future Appointments  Date Time Provider Avi Shin   2/19/2019 12:00 PM MD Radha Vaughn [facility]            Patient Active Problem List:     Sleep apnea Sleep apnea     Kidney stone     Cellulitis     Mitral valve regurgitation     MI, old     CHF (congestive heart failure) (HCC)     Hypertension     Hyperlipidemia     Type 2 diabetes mellitus without complication, without long-term current use of insulin ([facility])     Presence of cardiac pacemaker     CAD (coronary artery disease)

## 2018-11-06 DIAGNOSIS — I25.10 CORONARY ARTERY DISEASE INVOLVING NATIVE CORONARY ARTERY OF NATIVE HEART WITHOUT ANGINA PECTORIS: ICD-10-CM

## 2018-11-06 DIAGNOSIS — E11.9 TYPE 2 DIABETES MELLITUS WITHOUT COMPLICATION, WITHOUT LONG-TERM CURRENT USE OF INSULIN (HCC): ICD-10-CM

## 2018-11-06 DIAGNOSIS — I34.0 MITRAL VALVE INSUFFICIENCY, UNSPECIFIED ETIOLOGY: ICD-10-CM

## 2018-11-06 DIAGNOSIS — I10 ESSENTIAL HYPERTENSION: ICD-10-CM

## 2018-11-06 DIAGNOSIS — E55.9 VITAMIN D DEFICIENCY DISEASE: ICD-10-CM

## 2018-11-06 DIAGNOSIS — E78.00 PURE HYPERCHOLESTEROLEMIA: ICD-10-CM

## 2018-11-07 ENCOUNTER — TELEPHONE (OUTPATIENT)
Dept: CARDIOLOGY CLINIC | Age: 68
End: 2018-11-07

## 2018-11-07 DIAGNOSIS — I10 ESSENTIAL HYPERTENSION: ICD-10-CM

## 2018-11-07 DIAGNOSIS — I50.9 CONGESTIVE HEART FAILURE, UNSPECIFIED HF CHRONICITY, UNSPECIFIED HEART FAILURE TYPE (HCC): Primary | ICD-10-CM

## 2018-11-13 LAB
BUN BLDV-MCNC: 24 MG/DL
CALCIUM SERPL-MCNC: 9.1 MG/DL
CHLORIDE BLD-SCNC: 105 MMOL/L
CO2: 28 MMOL/L
CREAT SERPL-MCNC: 1.74 MG/DL
GFR CALCULATED: 39
GLUCOSE BLD-MCNC: 132 MG/DL
POTASSIUM SERPL-SCNC: 3.9 MMOL/L
SODIUM BLD-SCNC: 140 MMOL/L

## 2018-11-14 DIAGNOSIS — I50.9 CONGESTIVE HEART FAILURE, UNSPECIFIED HF CHRONICITY, UNSPECIFIED HEART FAILURE TYPE (HCC): ICD-10-CM

## 2018-11-14 DIAGNOSIS — I10 ESSENTIAL HYPERTENSION: ICD-10-CM

## 2018-11-15 ENCOUNTER — TELEPHONE (OUTPATIENT)
Dept: CARDIOLOGY CLINIC | Age: 68
End: 2018-11-15

## 2018-11-15 DIAGNOSIS — I25.10 CORONARY ARTERY DISEASE INVOLVING NATIVE CORONARY ARTERY OF NATIVE HEART WITHOUT ANGINA PECTORIS: Primary | ICD-10-CM

## 2018-11-15 DIAGNOSIS — N28.9 KIDNEY INSUFFICIENCY: ICD-10-CM

## 2018-11-23 LAB
BUN BLDV-MCNC: 26 MG/DL
CALCIUM SERPL-MCNC: 8.7 MG/DL
CHLORIDE BLD-SCNC: 110 MMOL/L
CO2: 27 MMOL/L
CREAT SERPL-MCNC: 1.73 MG/DL
GFR CALCULATED: 39
GLUCOSE BLD-MCNC: 65 MG/DL
POTASSIUM SERPL-SCNC: 3.9 MMOL/L
SODIUM BLD-SCNC: 143 MMOL/L

## 2018-11-26 RX ORDER — AMIODARONE HYDROCHLORIDE 200 MG/1
TABLET ORAL
Qty: 90 TABLET | Refills: 3 | Status: SHIPPED | OUTPATIENT
Start: 2018-11-26 | End: 2019-01-21 | Stop reason: ALTCHOICE

## 2018-11-27 DIAGNOSIS — N28.9 KIDNEY INSUFFICIENCY: ICD-10-CM

## 2019-01-21 ENCOUNTER — HOSPITAL ENCOUNTER (OUTPATIENT)
Age: 69
Discharge: HOME | End: 2019-01-21
Payer: COMMERCIAL

## 2019-01-21 ENCOUNTER — OFFICE VISIT (OUTPATIENT)
Dept: CARDIOLOGY CLINIC | Age: 69
End: 2019-01-21
Payer: COMMERCIAL

## 2019-01-21 VITALS
WEIGHT: 270 LBS | SYSTOLIC BLOOD PRESSURE: 130 MMHG | BODY MASS INDEX: 41.05 KG/M2 | DIASTOLIC BLOOD PRESSURE: 70 MMHG | HEART RATE: 77 BPM | OXYGEN SATURATION: 98 %

## 2019-01-21 DIAGNOSIS — R94.31 ABNORMAL EKG: ICD-10-CM

## 2019-01-21 DIAGNOSIS — I25.10 CORONARY ARTERY DISEASE INVOLVING NATIVE CORONARY ARTERY OF NATIVE HEART WITHOUT ANGINA PECTORIS: ICD-10-CM

## 2019-01-21 DIAGNOSIS — I50.9 CONGESTIVE HEART FAILURE, UNSPECIFIED HF CHRONICITY, UNSPECIFIED HEART FAILURE TYPE (HCC): ICD-10-CM

## 2019-01-21 DIAGNOSIS — E11.9 TYPE 2 DIABETES MELLITUS WITHOUT COMPLICATION, WITHOUT LONG-TERM CURRENT USE OF INSULIN (HCC): ICD-10-CM

## 2019-01-21 DIAGNOSIS — I10 ESSENTIAL HYPERTENSION: ICD-10-CM

## 2019-01-21 DIAGNOSIS — R94.31 ABNORMAL EKG: Primary | ICD-10-CM

## 2019-01-21 LAB
ABSOLUTE EOS #: 0.1 K/UL (ref 0–0.4)
ABSOLUTE IMMATURE GRANULOCYTE: NORMAL K/UL (ref 0–0.3)
ABSOLUTE LYMPH #: 1.1 K/UL (ref 1–4.8)
ABSOLUTE MONO #: 0.6 K/UL (ref 0–1)
ALBUMIN SERPL-MCNC: 4.9 G/DL (ref 3.5–5.2)
ALBUMIN/GLOBULIN RATIO: ABNORMAL (ref 1–2.5)
ALP BLD-CCNC: 73 U/L (ref 40–129)
ALT SERPL-CCNC: 42 U/L (ref 5–41)
ANION GAP SERPL CALCULATED.3IONS-SCNC: 12 MMOL/L (ref 9–17)
AST SERPL-CCNC: 38 U/L
BASOPHILS # BLD: 0 % (ref 0–2)
BASOPHILS ABSOLUTE: 0 K/UL (ref 0–0.2)
BILIRUB SERPL-MCNC: 0.9 MG/DL (ref 0.3–1.2)
BUN BLDV-MCNC: 32 MG/DL (ref 8–23)
BUN/CREAT BLD: 19 (ref 9–20)
CALCIUM SERPL-MCNC: 10.5 MG/DL (ref 8.6–10.4)
CHLORIDE BLD-SCNC: 98 MMOL/L (ref 98–107)
CO2: 26 MMOL/L (ref 20–31)
CREAT SERPL-MCNC: 1.71 MG/DL (ref 0.7–1.2)
DIFFERENTIAL TYPE: YES
EOSINOPHILS RELATIVE PERCENT: 2 % (ref 0–5)
GFR AFRICAN AMERICAN: 48 ML/MIN
GFR NON-AFRICAN AMERICAN: 40 ML/MIN
GFR SERPL CREATININE-BSD FRML MDRD: ABNORMAL ML/MIN/{1.73_M2}
GFR SERPL CREATININE-BSD FRML MDRD: ABNORMAL ML/MIN/{1.73_M2}
GLUCOSE BLD-MCNC: 149 MG/DL (ref 70–99)
HCT VFR BLD CALC: 47.9 % (ref 41–53)
HEMOGLOBIN: 15.5 G/DL (ref 13.5–17.5)
IMMATURE GRANULOCYTES: NORMAL %
LYMPHOCYTES # BLD: 16 % (ref 13–44)
MCH RBC QN AUTO: 30.8 PG (ref 26–34)
MCHC RBC AUTO-ENTMCNC: 32.3 G/DL (ref 31–37)
MCV RBC AUTO: 95.2 FL (ref 80–100)
MONOCYTES # BLD: 9 % (ref 5–9)
NRBC AUTOMATED: NORMAL PER 100 WBC
PDW BLD-RTO: 14.6 % (ref 12.1–15.2)
PLATELET # BLD: 228 K/UL (ref 140–450)
PLATELET ESTIMATE: NORMAL
PMV BLD AUTO: NORMAL FL (ref 6–12)
POTASSIUM SERPL-SCNC: 4.3 MMOL/L (ref 3.7–5.3)
RBC # BLD: 5.03 M/UL (ref 4.5–5.9)
RBC # BLD: NORMAL 10*6/UL
SEG NEUTROPHILS: 73 % (ref 39–75)
SEGMENTED NEUTROPHILS ABSOLUTE COUNT: 4.9 K/UL (ref 2.1–6.5)
SODIUM BLD-SCNC: 136 MMOL/L (ref 135–144)
TOTAL PROTEIN: 8 G/DL (ref 6.4–8.3)
WBC # BLD: 6.7 K/UL (ref 3.5–11)
WBC # BLD: NORMAL 10*3/UL

## 2019-01-21 PROCEDURE — 85025 COMPLETE CBC W/AUTO DIFF WBC: CPT

## 2019-01-21 PROCEDURE — 36415 COLL VENOUS BLD VENIPUNCTURE: CPT

## 2019-01-21 PROCEDURE — 80053 COMPREHEN METABOLIC PANEL: CPT

## 2019-01-21 PROCEDURE — 99214 OFFICE O/P EST MOD 30 MIN: CPT | Performed by: INTERNAL MEDICINE

## 2019-01-21 RX ORDER — CARVEDILOL 3.12 MG/1
3.12 TABLET ORAL 2 TIMES DAILY WITH MEALS
Qty: 60 TABLET | Refills: 11 | Status: SHIPPED | OUTPATIENT
Start: 2019-01-21 | End: 2019-02-19 | Stop reason: SDUPTHER

## 2019-01-21 RX ORDER — ERYTHROMYCIN 5 MG/G
OINTMENT OPHTHALMIC
Refills: 0 | COMMUNITY
Start: 2018-12-30 | End: 2019-02-04 | Stop reason: ALTCHOICE

## 2019-01-21 RX ORDER — CEFDINIR 300 MG/1
CAPSULE ORAL
Refills: 0 | COMMUNITY
Start: 2019-01-11 | End: 2019-02-04 | Stop reason: ALTCHOICE

## 2019-01-21 RX ORDER — NYSTATIN 100000 [USP'U]/G
1 POWDER TOPICAL DAILY
Refills: 0 | COMMUNITY
Start: 2019-01-14 | End: 2020-10-27

## 2019-01-21 RX ORDER — AMIODARONE HYDROCHLORIDE 200 MG/1
200 TABLET ORAL DAILY
COMMUNITY
End: 2019-12-11 | Stop reason: SDUPTHER

## 2019-01-21 RX ORDER — FUROSEMIDE 20 MG/1
20 TABLET ORAL DAILY
COMMUNITY
End: 2020-01-13 | Stop reason: SDUPTHER

## 2019-01-21 RX ORDER — CARVEDILOL 3.12 MG/1
3.12 TABLET ORAL 2 TIMES DAILY WITH MEALS
COMMUNITY
End: 2019-01-21 | Stop reason: SDUPTHER

## 2019-01-31 ENCOUNTER — TELEPHONE (OUTPATIENT)
Dept: CARDIOLOGY CLINIC | Age: 69
End: 2019-01-31

## 2019-02-01 RX ORDER — RAMIPRIL 2.5 MG/1
CAPSULE ORAL
Qty: 90 CAPSULE | Refills: 3 | Status: SHIPPED | OUTPATIENT
Start: 2019-02-01 | End: 2019-02-19 | Stop reason: SDUPTHER

## 2019-02-04 ENCOUNTER — HOSPITAL ENCOUNTER (OUTPATIENT)
Dept: NUCLEAR MEDICINE | Age: 69
Discharge: HOME | End: 2019-02-06
Payer: COMMERCIAL

## 2019-02-04 ENCOUNTER — HOSPITAL ENCOUNTER (OUTPATIENT)
Dept: NON INVASIVE DIAGNOSTICS | Age: 69
Discharge: HOME | End: 2019-02-04
Payer: COMMERCIAL

## 2019-02-04 ENCOUNTER — OFFICE VISIT (OUTPATIENT)
Dept: CARDIOLOGY CLINIC | Age: 69
End: 2019-02-04
Payer: COMMERCIAL

## 2019-02-04 VITALS
DIASTOLIC BLOOD PRESSURE: 70 MMHG | WEIGHT: 271 LBS | SYSTOLIC BLOOD PRESSURE: 120 MMHG | HEART RATE: 88 BPM | BODY MASS INDEX: 41.21 KG/M2 | OXYGEN SATURATION: 96 %

## 2019-02-04 VITALS — DIASTOLIC BLOOD PRESSURE: 60 MMHG | SYSTOLIC BLOOD PRESSURE: 103 MMHG | HEART RATE: 86 BPM

## 2019-02-04 DIAGNOSIS — R94.31 ABNORMAL EKG: ICD-10-CM

## 2019-02-04 DIAGNOSIS — I50.9 CONGESTIVE HEART FAILURE, UNSPECIFIED HF CHRONICITY, UNSPECIFIED HEART FAILURE TYPE (HCC): ICD-10-CM

## 2019-02-04 DIAGNOSIS — I25.10 CORONARY ARTERY DISEASE INVOLVING NATIVE CORONARY ARTERY OF NATIVE HEART WITHOUT ANGINA PECTORIS: ICD-10-CM

## 2019-02-04 DIAGNOSIS — E11.9 TYPE 2 DIABETES MELLITUS WITHOUT COMPLICATION, WITHOUT LONG-TERM CURRENT USE OF INSULIN (HCC): ICD-10-CM

## 2019-02-04 DIAGNOSIS — I10 ESSENTIAL HYPERTENSION: ICD-10-CM

## 2019-02-04 DIAGNOSIS — I48.91 ATRIAL FIBRILLATION, UNSPECIFIED TYPE (HCC): Primary | ICD-10-CM

## 2019-02-04 DIAGNOSIS — R06.02 SOB (SHORTNESS OF BREATH): ICD-10-CM

## 2019-02-04 PROCEDURE — 93225 XTRNL ECG REC<48 HRS REC: CPT

## 2019-02-04 PROCEDURE — A9500 TC99M SESTAMIBI: HCPCS | Performed by: INTERNAL MEDICINE

## 2019-02-04 PROCEDURE — 3430000000 HC RX DIAGNOSTIC RADIOPHARMACEUTICAL: Performed by: INTERNAL MEDICINE

## 2019-02-04 PROCEDURE — 93017 CV STRESS TEST TRACING ONLY: CPT

## 2019-02-04 PROCEDURE — 78452 HT MUSCLE IMAGE SPECT MULT: CPT

## 2019-02-04 PROCEDURE — 99212 OFFICE O/P EST SF 10 MIN: CPT | Performed by: INTERNAL MEDICINE

## 2019-02-04 RX ORDER — AMINOPHYLLINE DIHYDRATE 25 MG/ML
50 INJECTION, SOLUTION INTRAVENOUS
Status: ACTIVE | OUTPATIENT
Start: 2019-02-04 | End: 2019-02-04

## 2019-02-04 RX ORDER — AMINOPHYLLINE DIHYDRATE 25 MG/ML
100 INJECTION, SOLUTION INTRAVENOUS
Status: DISPENSED | OUTPATIENT
Start: 2019-02-04 | End: 2019-02-04

## 2019-02-04 RX ORDER — 0.9 % SODIUM CHLORIDE 0.9 %
10 VIAL (ML) INJECTION PRN
Status: DISCONTINUED | OUTPATIENT
Start: 2019-02-04 | End: 2019-02-05 | Stop reason: HOSPADM

## 2019-02-04 RX ADMIN — TETRAKIS(2-METHOXYISOBUTYLISOCYANIDE)COPPER(I) TETRAFLUOROBORATE 30 MILLICURIE: 1 INJECTION, POWDER, LYOPHILIZED, FOR SOLUTION INTRAVENOUS at 08:15

## 2019-02-04 RX ADMIN — TETRAKIS(2-METHOXYISOBUTYLISOCYANIDE)COPPER(I) TETRAFLUOROBORATE 10 MILLICURIE: 1 INJECTION, POWDER, LYOPHILIZED, FOR SOLUTION INTRAVENOUS at 06:53

## 2019-02-19 ENCOUNTER — OFFICE VISIT (OUTPATIENT)
Dept: CARDIOLOGY CLINIC | Age: 69
End: 2019-02-19
Payer: COMMERCIAL

## 2019-02-19 ENCOUNTER — HOSPITAL ENCOUNTER (OUTPATIENT)
Dept: NON INVASIVE DIAGNOSTICS | Age: 69
Discharge: HOME | End: 2019-02-19
Payer: COMMERCIAL

## 2019-02-19 ENCOUNTER — HOSPITAL ENCOUNTER (OUTPATIENT)
Age: 69
Discharge: HOME | End: 2019-02-19
Payer: COMMERCIAL

## 2019-02-19 VITALS
BODY MASS INDEX: 40.9 KG/M2 | DIASTOLIC BLOOD PRESSURE: 60 MMHG | OXYGEN SATURATION: 97 % | HEART RATE: 90 BPM | SYSTOLIC BLOOD PRESSURE: 110 MMHG | WEIGHT: 269 LBS

## 2019-02-19 VITALS
DIASTOLIC BLOOD PRESSURE: 68 MMHG | RESPIRATION RATE: 15 BRPM | SYSTOLIC BLOOD PRESSURE: 108 MMHG | HEART RATE: 66 BPM | OXYGEN SATURATION: 97 %

## 2019-02-19 DIAGNOSIS — I25.10 CORONARY ARTERY DISEASE INVOLVING NATIVE CORONARY ARTERY OF NATIVE HEART WITHOUT ANGINA PECTORIS: ICD-10-CM

## 2019-02-19 DIAGNOSIS — I10 ESSENTIAL HYPERTENSION: ICD-10-CM

## 2019-02-19 DIAGNOSIS — I48.91 ATRIAL FIBRILLATION WITH CONTROLLED VENTRICULAR RESPONSE (HCC): ICD-10-CM

## 2019-02-19 DIAGNOSIS — I25.10 CORONARY ARTERY DISEASE INVOLVING NATIVE CORONARY ARTERY OF NATIVE HEART WITHOUT ANGINA PECTORIS: Primary | ICD-10-CM

## 2019-02-19 DIAGNOSIS — I34.0 MITRAL VALVE INSUFFICIENCY, UNSPECIFIED ETIOLOGY: ICD-10-CM

## 2019-02-19 DIAGNOSIS — E78.00 PURE HYPERCHOLESTEROLEMIA: ICD-10-CM

## 2019-02-19 DIAGNOSIS — R06.02 SOB (SHORTNESS OF BREATH): ICD-10-CM

## 2019-02-19 DIAGNOSIS — E55.9 VITAMIN D DEFICIENCY DISEASE: ICD-10-CM

## 2019-02-19 DIAGNOSIS — I48.91 ATRIAL FIBRILLATION, UNSPECIFIED TYPE (HCC): ICD-10-CM

## 2019-02-19 DIAGNOSIS — E11.9 TYPE 2 DIABETES MELLITUS WITHOUT COMPLICATION, WITHOUT LONG-TERM CURRENT USE OF INSULIN (HCC): ICD-10-CM

## 2019-02-19 LAB
ABSOLUTE EOS #: 0.2 K/UL (ref 0–0.4)
ABSOLUTE IMMATURE GRANULOCYTE: ABNORMAL K/UL (ref 0–0.3)
ABSOLUTE LYMPH #: 1.3 K/UL (ref 1–4.8)
ABSOLUTE MONO #: 0.6 K/UL (ref 0–1)
ALBUMIN SERPL-MCNC: 4.5 G/DL (ref 3.5–5.2)
ALBUMIN/GLOBULIN RATIO: ABNORMAL (ref 1–2.5)
ALP BLD-CCNC: 69 U/L (ref 40–129)
ALT SERPL-CCNC: 31 U/L (ref 5–41)
ANION GAP SERPL CALCULATED.3IONS-SCNC: 12 MMOL/L (ref 9–17)
AST SERPL-CCNC: 28 U/L
BASOPHILS # BLD: 0 % (ref 0–2)
BASOPHILS ABSOLUTE: 0 K/UL (ref 0–0.2)
BILIRUB SERPL-MCNC: 0.83 MG/DL (ref 0.3–1.2)
BUN BLDV-MCNC: 37 MG/DL (ref 8–23)
BUN/CREAT BLD: 19 (ref 9–20)
CALCIUM SERPL-MCNC: 9.6 MG/DL (ref 8.6–10.4)
CHLORIDE BLD-SCNC: 100 MMOL/L (ref 98–107)
CHOLESTEROL/HDL RATIO: 2.7
CHOLESTEROL: 124 MG/DL
CO2: 26 MMOL/L (ref 20–31)
CREAT SERPL-MCNC: 2 MG/DL (ref 0.7–1.2)
DIFFERENTIAL TYPE: YES
EOSINOPHILS RELATIVE PERCENT: 3 % (ref 0–5)
GFR AFRICAN AMERICAN: 40 ML/MIN
GFR NON-AFRICAN AMERICAN: 33 ML/MIN
GFR SERPL CREATININE-BSD FRML MDRD: ABNORMAL ML/MIN/{1.73_M2}
GFR SERPL CREATININE-BSD FRML MDRD: ABNORMAL ML/MIN/{1.73_M2}
GLUCOSE BLD-MCNC: 97 MG/DL (ref 70–99)
HCT VFR BLD CALC: 45 % (ref 41–53)
HDLC SERPL-MCNC: 46 MG/DL
HEMOGLOBIN: 14.7 G/DL (ref 13.5–17.5)
IMMATURE GRANULOCYTES: ABNORMAL %
LDL CHOLESTEROL: 62 MG/DL (ref 0–130)
LYMPHOCYTES # BLD: 21 % (ref 13–44)
MAGNESIUM: 2.5 MG/DL (ref 1.6–2.6)
MCH RBC QN AUTO: 31.4 PG (ref 26–34)
MCHC RBC AUTO-ENTMCNC: 32.8 G/DL (ref 31–37)
MCV RBC AUTO: 95.8 FL (ref 80–100)
MONOCYTES # BLD: 10 % (ref 5–9)
NRBC AUTOMATED: ABNORMAL PER 100 WBC
PATIENT FASTING?: YES
PDW BLD-RTO: 14.9 % (ref 12.1–15.2)
PLATELET # BLD: 194 K/UL (ref 140–450)
PLATELET ESTIMATE: ABNORMAL
PMV BLD AUTO: ABNORMAL FL (ref 6–12)
POTASSIUM SERPL-SCNC: 4.2 MMOL/L (ref 3.7–5.3)
RBC # BLD: 4.7 M/UL (ref 4.5–5.9)
RBC # BLD: ABNORMAL 10*6/UL
SEG NEUTROPHILS: 66 % (ref 39–75)
SEGMENTED NEUTROPHILS ABSOLUTE COUNT: 4.3 K/UL (ref 2.1–6.5)
SODIUM BLD-SCNC: 138 MMOL/L (ref 135–144)
THYROXINE, FREE: 1.33 NG/DL (ref 0.93–1.7)
TOTAL PROTEIN: 7.6 G/DL (ref 6.4–8.3)
TRIGL SERPL-MCNC: 81 MG/DL
TSH SERPL DL<=0.05 MIU/L-ACNC: 15.51 MIU/L (ref 0.3–5)
VITAMIN D 25-HYDROXY: 34.3 NG/ML (ref 30–100)
VLDLC SERPL CALC-MCNC: NORMAL MG/DL (ref 1–30)
WBC # BLD: 6.5 K/UL (ref 3.5–11)
WBC # BLD: ABNORMAL 10*3/UL

## 2019-02-19 PROCEDURE — 99214 OFFICE O/P EST MOD 30 MIN: CPT | Performed by: INTERNAL MEDICINE

## 2019-02-19 PROCEDURE — 6360000002 HC RX W HCPCS: Performed by: INTERNAL MEDICINE

## 2019-02-19 PROCEDURE — 80061 LIPID PANEL: CPT

## 2019-02-19 PROCEDURE — 85025 COMPLETE CBC W/AUTO DIFF WBC: CPT

## 2019-02-19 PROCEDURE — 82306 VITAMIN D 25 HYDROXY: CPT

## 2019-02-19 PROCEDURE — 92960 CARDIOVERSION ELECTRIC EXT: CPT

## 2019-02-19 PROCEDURE — 80053 COMPREHEN METABOLIC PANEL: CPT

## 2019-02-19 PROCEDURE — 84439 ASSAY OF FREE THYROXINE: CPT

## 2019-02-19 PROCEDURE — 84443 ASSAY THYROID STIM HORMONE: CPT

## 2019-02-19 PROCEDURE — 36415 COLL VENOUS BLD VENIPUNCTURE: CPT

## 2019-02-19 PROCEDURE — 2580000003 HC RX 258: Performed by: INTERNAL MEDICINE

## 2019-02-19 PROCEDURE — 83735 ASSAY OF MAGNESIUM: CPT

## 2019-02-19 PROCEDURE — 92960 CARDIOVERSION ELECTRIC EXT: CPT | Performed by: INTERNAL MEDICINE

## 2019-02-19 RX ORDER — CARVEDILOL 3.12 MG/1
3.12 TABLET ORAL 2 TIMES DAILY WITH MEALS
Qty: 180 TABLET | Refills: 3 | Status: SHIPPED | OUTPATIENT
Start: 2019-02-19 | End: 2019-03-18 | Stop reason: SDUPTHER

## 2019-02-19 RX ORDER — PROPOFOL 10 MG/ML
INJECTION, EMULSION INTRAVENOUS CONTINUOUS PRN
Status: COMPLETED | OUTPATIENT
Start: 2019-02-19 | End: 2019-02-19

## 2019-02-19 RX ORDER — SODIUM CHLORIDE 450 MG/100ML
INJECTION, SOLUTION INTRAVENOUS CONTINUOUS PRN
Status: COMPLETED | OUTPATIENT
Start: 2019-02-19 | End: 2019-02-19

## 2019-02-19 RX ADMIN — SODIUM CHLORIDE 50 ML/HR: 4.5 INJECTION, SOLUTION INTRAVENOUS at 09:57

## 2019-02-19 RX ADMIN — PROPOFOL 140 MG: 10 INJECTION, EMULSION INTRAVENOUS at 10:11

## 2019-02-25 LAB
EKG ATRIAL RATE: 178 BPM
EKG ATRIAL RATE: 241 BPM
EKG P AXIS: 76 DEGREES
EKG Q-T INTERVAL: 446 MS
EKG Q-T INTERVAL: 478 MS
EKG QRS DURATION: 128 MS
EKG QRS DURATION: 172 MS
EKG QTC CALCULATION (BAZETT): 461 MS
EKG QTC CALCULATION (BAZETT): 511 MS
EKG R AXIS: 25 DEGREES
EKG R AXIS: 87 DEGREES
EKG T AXIS: -82 DEGREES
EKG T AXIS: 111 DEGREES
EKG VENTRICULAR RATE: 56 BPM
EKG VENTRICULAR RATE: 79 BPM

## 2019-02-25 RX ORDER — ATORVASTATIN CALCIUM 80 MG/1
TABLET, FILM COATED ORAL
Qty: 90 TABLET | Refills: 2 | Status: SHIPPED | OUTPATIENT
Start: 2019-02-25 | End: 2019-12-11 | Stop reason: SDUPTHER

## 2019-03-18 ENCOUNTER — HOSPITAL ENCOUNTER (OUTPATIENT)
Age: 69
Discharge: HOME | End: 2019-03-18
Payer: COMMERCIAL

## 2019-03-18 ENCOUNTER — OFFICE VISIT (OUTPATIENT)
Dept: CARDIOLOGY CLINIC | Age: 69
End: 2019-03-18
Payer: COMMERCIAL

## 2019-03-18 VITALS
WEIGHT: 273 LBS | DIASTOLIC BLOOD PRESSURE: 70 MMHG | BODY MASS INDEX: 41.51 KG/M2 | OXYGEN SATURATION: 98 % | SYSTOLIC BLOOD PRESSURE: 110 MMHG | HEART RATE: 94 BPM

## 2019-03-18 DIAGNOSIS — I48.91 ATRIAL FIBRILLATION WITH CONTROLLED VENTRICULAR RESPONSE (HCC): ICD-10-CM

## 2019-03-18 DIAGNOSIS — I25.10 CORONARY ARTERY DISEASE INVOLVING NATIVE CORONARY ARTERY OF NATIVE HEART WITHOUT ANGINA PECTORIS: ICD-10-CM

## 2019-03-18 DIAGNOSIS — I10 ESSENTIAL HYPERTENSION: ICD-10-CM

## 2019-03-18 DIAGNOSIS — I34.0 MITRAL VALVE INSUFFICIENCY, UNSPECIFIED ETIOLOGY: ICD-10-CM

## 2019-03-18 DIAGNOSIS — I25.10 CORONARY ARTERY DISEASE INVOLVING NATIVE CORONARY ARTERY OF NATIVE HEART WITHOUT ANGINA PECTORIS: Primary | ICD-10-CM

## 2019-03-18 LAB
ANION GAP SERPL CALCULATED.3IONS-SCNC: 11 MMOL/L (ref 9–17)
BUN BLDV-MCNC: 31 MG/DL (ref 8–23)
BUN/CREAT BLD: 17 (ref 9–20)
CALCIUM SERPL-MCNC: 9.6 MG/DL (ref 8.6–10.4)
CHLORIDE BLD-SCNC: 99 MMOL/L (ref 98–107)
CO2: 27 MMOL/L (ref 20–31)
CREAT SERPL-MCNC: 1.8 MG/DL (ref 0.7–1.2)
GFR AFRICAN AMERICAN: 46 ML/MIN
GFR NON-AFRICAN AMERICAN: 38 ML/MIN
GFR SERPL CREATININE-BSD FRML MDRD: ABNORMAL ML/MIN/{1.73_M2}
GFR SERPL CREATININE-BSD FRML MDRD: ABNORMAL ML/MIN/{1.73_M2}
GLUCOSE BLD-MCNC: 267 MG/DL (ref 70–99)
POTASSIUM SERPL-SCNC: 4.2 MMOL/L (ref 3.7–5.3)
SODIUM BLD-SCNC: 137 MMOL/L (ref 135–144)

## 2019-03-18 PROCEDURE — 99213 OFFICE O/P EST LOW 20 MIN: CPT | Performed by: INTERNAL MEDICINE

## 2019-03-18 PROCEDURE — 80048 BASIC METABOLIC PNL TOTAL CA: CPT

## 2019-03-18 PROCEDURE — 36415 COLL VENOUS BLD VENIPUNCTURE: CPT

## 2019-03-18 PROCEDURE — 93005 ELECTROCARDIOGRAM TRACING: CPT

## 2019-03-18 RX ORDER — CARVEDILOL 6.25 MG/1
6.25 TABLET ORAL 2 TIMES DAILY WITH MEALS
Qty: 60 TABLET | Refills: 11 | Status: SHIPPED | OUTPATIENT
Start: 2019-03-18 | End: 2019-06-17 | Stop reason: SDUPTHER

## 2019-03-19 LAB
EKG ATRIAL RATE: 92 BPM
EKG P-R INTERVAL: 270 MS
EKG Q-T INTERVAL: 412 MS
EKG QRS DURATION: 170 MS
EKG QTC CALCULATION (BAZETT): 509 MS
EKG R AXIS: 62 DEGREES
EKG T AXIS: -129 DEGREES
EKG VENTRICULAR RATE: 92 BPM

## 2019-04-26 ENCOUNTER — TELEPHONE (OUTPATIENT)
Dept: CARDIOLOGY CLINIC | Age: 69
End: 2019-04-26

## 2019-04-26 DIAGNOSIS — I34.0 MITRAL VALVE INSUFFICIENCY, UNSPECIFIED ETIOLOGY: ICD-10-CM

## 2019-04-26 NOTE — TELEPHONE ENCOUNTER
Pt called with echo results reviewed per DR Jiménez Mode unchanged will review EKG when we get it and decide next steps.

## 2019-04-30 DIAGNOSIS — I48.91 ATRIAL FIBRILLATION WITH CONTROLLED VENTRICULAR RESPONSE (HCC): ICD-10-CM

## 2019-04-30 DIAGNOSIS — I10 ESSENTIAL HYPERTENSION: ICD-10-CM

## 2019-05-02 ENCOUNTER — TELEPHONE (OUTPATIENT)
Dept: CARDIOLOGY CLINIC | Age: 69
End: 2019-05-02

## 2019-05-02 DIAGNOSIS — I42.9 CARDIOMYOPATHY, UNSPECIFIED TYPE (HCC): Primary | ICD-10-CM

## 2019-05-16 ENCOUNTER — TELEPHONE (OUTPATIENT)
Dept: CARDIOLOGY CLINIC | Age: 69
End: 2019-05-16

## 2019-05-16 NOTE — TELEPHONE ENCOUNTER
Pt called was up [address] working on [employer] by ICD did stop in office on way  Home. ICD checked shock 2:45  Pt no c/o prior just little   Tired today. Has appt at [facility]   On June 13th. Dr Zelda Schroeder talked with pt   To increase amiodarone to   7 days a week from 6 days   Until seen at [facility].

## 2019-06-17 RX ORDER — CLOPIDOGREL BISULFATE 75 MG/1
TABLET ORAL
Qty: 90 TABLET | Refills: 2 | Status: SHIPPED | OUTPATIENT
Start: 2019-06-17 | End: 2019-11-12 | Stop reason: ALTCHOICE

## 2019-06-17 RX ORDER — CARVEDILOL 6.25 MG/1
6.25 TABLET ORAL 2 TIMES DAILY WITH MEALS
Qty: 180 TABLET | Refills: 3 | Status: SHIPPED | OUTPATIENT
Start: 2019-06-17 | End: 2020-06-16 | Stop reason: SDUPTHER

## 2019-08-02 ENCOUNTER — TELEPHONE (OUTPATIENT)
Dept: CARDIOLOGY CLINIC | Age: 69
End: 2019-08-02

## 2019-08-02 RX ORDER — PANTOPRAZOLE SODIUM 40 MG/1
40 TABLET, DELAYED RELEASE ORAL DAILY
COMMUNITY
Start: 2019-07-30 | End: 2019-11-12 | Stop reason: ALTCHOICE

## 2019-08-02 RX ORDER — LEVOTHYROXINE SODIUM 0.03 MG/1
5 TABLET ORAL DAILY
COMMUNITY
Start: 2019-07-31 | End: 2020-10-27

## 2019-08-02 NOTE — TELEPHONE ENCOUNTER
Patient called with update for Dr. Jenn Gamez. He has his ablation done Monday at [facility]. He was released to come home on Tuesday. They increased his Eliquis from 2.5mg BID to 5mg BID,  They started him on Protonix 40mg daily for 30 days. Primary care physician Dr. Nandini Hassan started him on Levothyroxine 25mcg QD. He is to go back to [facility] for follow up visit in 3 months. They want to put in a different defibrillator with 2 leads, not sure if he would qualify for 3 leads. He currently does not have follow up appt to see Dr Jenn Gamez and was wondering if or when Dr. Jenn Gamez would want to see him.

## 2019-08-06 ENCOUNTER — TELEPHONE (OUTPATIENT)
Dept: CARDIOLOGY CLINIC | Age: 69
End: 2019-08-06

## 2019-08-06 DIAGNOSIS — Z95.0 PRESENCE OF CARDIAC PACEMAKER: ICD-10-CM

## 2019-08-06 DIAGNOSIS — I10 ESSENTIAL HYPERTENSION: ICD-10-CM

## 2019-08-06 DIAGNOSIS — I25.10 CORONARY ARTERY DISEASE INVOLVING NATIVE CORONARY ARTERY OF NATIVE HEART WITHOUT ANGINA PECTORIS: ICD-10-CM

## 2019-08-06 DIAGNOSIS — E78.00 PURE HYPERCHOLESTEROLEMIA: ICD-10-CM

## 2019-08-06 DIAGNOSIS — E55.9 VITAMIN D DEFICIENCY: ICD-10-CM

## 2019-08-06 DIAGNOSIS — I34.0 MITRAL VALVE INSUFFICIENCY, UNSPECIFIED ETIOLOGY: Primary | ICD-10-CM

## 2019-10-17 LAB
ALBUMIN SERPL-MCNC: 4 G/DL
ALP BLD-CCNC: 66 U/L
ALT SERPL-CCNC: 55 U/L
ANION GAP SERPL CALCULATED.3IONS-SCNC: NORMAL MMOL/L
AST SERPL-CCNC: 48 U/L
BILIRUB SERPL-MCNC: 1 MG/DL (ref 0.1–1.4)
BUN BLDV-MCNC: 38 MG/DL
CALCIUM SERPL-MCNC: 9.1 MG/DL
CHLORIDE BLD-SCNC: 99 MMOL/L
CHOLESTEROL, TOTAL: 138 MG/DL
CHOLESTEROL/HDL RATIO: ABNORMAL
CO2: NORMAL MMOL/L
CREAT SERPL-MCNC: 2.18 MG/DL
GFR CALCULATED: 30
GLUCOSE BLD-MCNC: 211 MG/DL
HDLC SERPL-MCNC: 38 MG/DL (ref 35–70)
LDL CHOLESTEROL CALCULATED: 80 MG/DL (ref 0–160)
MAGNESIUM: 2.5 MG/DL
POTASSIUM SERPL-SCNC: 4.2 MMOL/L
SODIUM BLD-SCNC: 138 MMOL/L
TOTAL PROTEIN: 6.6
TRIGL SERPL-MCNC: 99 MG/DL
TSH SERPL DL<=0.05 MIU/L-ACNC: 6.69 UIU/ML
VITAMIN D 25-HYDROXY: 41.6
VITAMIN D2, 25 HYDROXY: NORMAL
VITAMIN D3,25 HYDROXY: NORMAL
VLDLC SERPL CALC-MCNC: ABNORMAL MG/DL

## 2019-10-21 DIAGNOSIS — E78.00 PURE HYPERCHOLESTEROLEMIA: ICD-10-CM

## 2019-10-21 DIAGNOSIS — E55.9 VITAMIN D DEFICIENCY: ICD-10-CM

## 2019-10-21 DIAGNOSIS — Z95.0 PRESENCE OF CARDIAC PACEMAKER: ICD-10-CM

## 2019-10-21 DIAGNOSIS — I10 ESSENTIAL HYPERTENSION: ICD-10-CM

## 2019-10-21 DIAGNOSIS — I34.0 MITRAL VALVE INSUFFICIENCY, UNSPECIFIED ETIOLOGY: ICD-10-CM

## 2019-10-21 DIAGNOSIS — I25.10 CORONARY ARTERY DISEASE INVOLVING NATIVE CORONARY ARTERY OF NATIVE HEART WITHOUT ANGINA PECTORIS: ICD-10-CM

## 2019-11-12 ENCOUNTER — OFFICE VISIT (OUTPATIENT)
Dept: CARDIOLOGY CLINIC | Age: 69
End: 2019-11-12
Payer: COMMERCIAL

## 2019-11-12 ENCOUNTER — HOSPITAL ENCOUNTER (OUTPATIENT)
Age: 69
Discharge: HOME | End: 2019-11-12
Payer: COMMERCIAL

## 2019-11-12 VITALS
HEART RATE: 89 BPM | DIASTOLIC BLOOD PRESSURE: 80 MMHG | SYSTOLIC BLOOD PRESSURE: 120 MMHG | BODY MASS INDEX: 43.18 KG/M2 | WEIGHT: 284 LBS | OXYGEN SATURATION: 89 %

## 2019-11-12 DIAGNOSIS — R74.8 ELEVATED LIVER ENZYMES: ICD-10-CM

## 2019-11-12 DIAGNOSIS — I48.91 ATRIAL FIBRILLATION WITH CONTROLLED VENTRICULAR RESPONSE (HCC): ICD-10-CM

## 2019-11-12 DIAGNOSIS — E78.00 PURE HYPERCHOLESTEROLEMIA: ICD-10-CM

## 2019-11-12 DIAGNOSIS — I10 ESSENTIAL HYPERTENSION: Primary | ICD-10-CM

## 2019-11-12 DIAGNOSIS — I25.10 CORONARY ARTERY DISEASE INVOLVING NATIVE CORONARY ARTERY OF NATIVE HEART WITHOUT ANGINA PECTORIS: ICD-10-CM

## 2019-11-12 DIAGNOSIS — E55.9 VITAMIN D DEFICIENCY: ICD-10-CM

## 2019-11-12 LAB
ALBUMIN SERPL-MCNC: 4.3 G/DL (ref 3.5–5.2)
ALBUMIN/GLOBULIN RATIO: ABNORMAL (ref 1–2.5)
ALP BLD-CCNC: 82 U/L (ref 40–129)
ALT SERPL-CCNC: 30 U/L (ref 5–41)
ANION GAP SERPL CALCULATED.3IONS-SCNC: 12 MMOL/L (ref 9–17)
AST SERPL-CCNC: 24 U/L
BILIRUB SERPL-MCNC: 0.82 MG/DL (ref 0.3–1.2)
BUN BLDV-MCNC: 23 MG/DL (ref 8–23)
BUN/CREAT BLD: 13 (ref 9–20)
CALCIUM SERPL-MCNC: 10.1 MG/DL (ref 8.6–10.4)
CHLORIDE BLD-SCNC: 101 MMOL/L (ref 98–107)
CO2: 27 MMOL/L (ref 20–31)
CREAT SERPL-MCNC: 1.75 MG/DL (ref 0.7–1.2)
GFR AFRICAN AMERICAN: 47 ML/MIN
GFR NON-AFRICAN AMERICAN: 39 ML/MIN
GFR SERPL CREATININE-BSD FRML MDRD: ABNORMAL ML/MIN/{1.73_M2}
GFR SERPL CREATININE-BSD FRML MDRD: ABNORMAL ML/MIN/{1.73_M2}
GLUCOSE BLD-MCNC: 127 MG/DL (ref 70–99)
POTASSIUM SERPL-SCNC: 4 MMOL/L (ref 3.7–5.3)
SODIUM BLD-SCNC: 140 MMOL/L (ref 135–144)
TOTAL PROTEIN: 7.6 G/DL (ref 6.4–8.3)

## 2019-11-12 PROCEDURE — 93005 ELECTROCARDIOGRAM TRACING: CPT

## 2019-11-12 PROCEDURE — 36415 COLL VENOUS BLD VENIPUNCTURE: CPT

## 2019-11-12 PROCEDURE — 99214 OFFICE O/P EST MOD 30 MIN: CPT | Performed by: INTERNAL MEDICINE

## 2019-11-12 PROCEDURE — 80053 COMPREHEN METABOLIC PANEL: CPT

## 2019-11-12 RX ORDER — MULTIVIT-MIN/IRON/FOLIC ACID/K 18-600-40
CAPSULE ORAL
COMMUNITY

## 2019-11-14 LAB
EKG ATRIAL RATE: 86 BPM
EKG P-R INTERVAL: 272 MS
EKG Q-T INTERVAL: 416 MS
EKG QRS DURATION: 172 MS
EKG QTC CALCULATION (BAZETT): 497 MS
EKG R AXIS: 79 DEGREES
EKG T AXIS: -143 DEGREES
EKG VENTRICULAR RATE: 86 BPM

## 2019-11-14 PROCEDURE — 93010 ELECTROCARDIOGRAM REPORT: CPT | Performed by: INTERNAL MEDICINE

## 2019-12-11 RX ORDER — RAMIPRIL 2.5 MG/1
CAPSULE ORAL
Qty: 90 CAPSULE | Refills: 3 | Status: SHIPPED | OUTPATIENT
Start: 2019-12-11 | End: 2020-09-14 | Stop reason: ALTCHOICE

## 2019-12-11 RX ORDER — ATORVASTATIN CALCIUM 80 MG/1
TABLET, FILM COATED ORAL
Qty: 90 TABLET | Refills: 2 | Status: SHIPPED | OUTPATIENT
Start: 2019-12-11 | End: 2020-06-16 | Stop reason: SDUPTHER

## 2019-12-11 RX ORDER — AMIODARONE HYDROCHLORIDE 200 MG/1
TABLET ORAL
Qty: 77 TABLET | Refills: 3 | Status: SHIPPED | OUTPATIENT
Start: 2019-12-11 | End: 2020-06-16 | Stop reason: CLARIF

## 2020-01-13 RX ORDER — FUROSEMIDE 20 MG/1
20 TABLET ORAL DAILY
Qty: 90 TABLET | Refills: 3 | Status: SHIPPED | OUTPATIENT
Start: 2020-01-13 | End: 2020-09-14 | Stop reason: ALTCHOICE

## 2020-02-04 ENCOUNTER — PROCEDURE VISIT (OUTPATIENT)
Dept: CARDIOLOGY CLINIC | Age: 70
End: 2020-02-04

## 2020-02-04 PROBLEM — Z95.810 ICD (IMPLANTABLE CARDIOVERTER-DEFIBRILLATOR) IN PLACE: Status: ACTIVE | Noted: 2020-02-04

## 2020-03-09 RX ORDER — SPIRONOLACTONE 25 MG/1
TABLET ORAL
Qty: 30 TABLET | Refills: 1 | Status: SHIPPED | OUTPATIENT
Start: 2020-03-09 | End: 2020-03-09 | Stop reason: SDUPTHER

## 2020-03-09 RX ORDER — SPIRONOLACTONE 25 MG/1
TABLET ORAL
Qty: 90 TABLET | Refills: 3 | Status: SHIPPED | OUTPATIENT
Start: 2020-03-09 | End: 2020-06-16 | Stop reason: SDUPTHER

## 2020-06-08 ENCOUNTER — TELEPHONE (OUTPATIENT)
Dept: CARDIOLOGY CLINIC | Age: 70
End: 2020-06-08

## 2020-06-08 NOTE — TELEPHONE ENCOUNTER
Patient called to check with Dr Nubia Mulligan to see what he wants him to do before his upcoming appt in office on 6/16/20. Dr Rajani Tejeda who put his new defibrillator in at [facility] wanted him to have a repeat Echo done and possible adjustment of his Amiodarone medication. Did Dr. Nubia Mulligan want to order the Echo for him to have done here prior to his appt in office or have Dr. Rajani Tejeda order? Please advise. Also does it matter if he has done in [facility] or [place]?

## 2020-06-16 ENCOUNTER — HOSPITAL ENCOUNTER (OUTPATIENT)
Age: 70
Setting detail: SPECIMEN
Discharge: HOME | End: 2020-06-16
Payer: COMMERCIAL

## 2020-06-16 ENCOUNTER — OFFICE VISIT (OUTPATIENT)
Dept: CARDIOLOGY CLINIC | Age: 70
End: 2020-06-16
Payer: COMMERCIAL

## 2020-06-16 ENCOUNTER — HOSPITAL ENCOUNTER (OUTPATIENT)
Age: 70
Discharge: HOME | End: 2020-06-16
Payer: COMMERCIAL

## 2020-06-16 VITALS
OXYGEN SATURATION: 97 % | SYSTOLIC BLOOD PRESSURE: 120 MMHG | WEIGHT: 279 LBS | BODY MASS INDEX: 42.42 KG/M2 | DIASTOLIC BLOOD PRESSURE: 70 MMHG | HEART RATE: 90 BPM

## 2020-06-16 LAB — SARS-COV-2 ANTIBODY, TOTAL: NEGATIVE

## 2020-06-16 PROCEDURE — 93005 ELECTROCARDIOGRAM TRACING: CPT

## 2020-06-16 PROCEDURE — 99214 OFFICE O/P EST MOD 30 MIN: CPT | Performed by: INTERNAL MEDICINE

## 2020-06-16 PROCEDURE — 86769 SARS-COV-2 COVID-19 ANTIBODY: CPT

## 2020-06-16 PROCEDURE — 36415 COLL VENOUS BLD VENIPUNCTURE: CPT

## 2020-06-16 PROCEDURE — 93289 INTERROG DEVICE EVAL HEART: CPT | Performed by: INTERNAL MEDICINE

## 2020-06-16 RX ORDER — SPIRONOLACTONE 25 MG/1
TABLET ORAL
Qty: 90 TABLET | Refills: 3 | Status: SHIPPED | OUTPATIENT
Start: 2020-06-16 | End: 2020-09-14 | Stop reason: ALTCHOICE

## 2020-06-16 RX ORDER — CARVEDILOL 6.25 MG/1
6.25 TABLET ORAL 2 TIMES DAILY WITH MEALS
Qty: 180 TABLET | Refills: 3 | Status: SHIPPED | OUTPATIENT
Start: 2020-06-16 | End: 2021-06-01

## 2020-06-16 RX ORDER — AMIODARONE HYDROCHLORIDE 200 MG/1
200 TABLET ORAL DAILY
COMMUNITY
End: 2020-08-25 | Stop reason: SDUPTHER

## 2020-06-16 RX ORDER — ATORVASTATIN CALCIUM 80 MG/1
TABLET, FILM COATED ORAL
Qty: 90 TABLET | Refills: 3 | Status: SHIPPED | OUTPATIENT
Start: 2020-06-16 | End: 2021-08-05

## 2020-06-17 ENCOUNTER — TELEPHONE (OUTPATIENT)
Dept: CARDIOLOGY CLINIC | Age: 70
End: 2020-06-17

## 2020-06-17 LAB
EKG ATRIAL RATE: 70 BPM
EKG P-R INTERVAL: 166 MS
EKG Q-T INTERVAL: 482 MS
EKG QRS DURATION: 162 MS
EKG QTC CALCULATION (BAZETT): 552 MS
EKG R AXIS: 133 DEGREES
EKG T AXIS: -36 DEGREES
EKG VENTRICULAR RATE: 79 BPM

## 2020-06-17 PROCEDURE — 93010 ELECTROCARDIOGRAM REPORT: CPT | Performed by: INTERNAL MEDICINE

## 2020-08-24 NOTE — PROGRESS NOTES
Pt notified will get cbc/cmp/tsh/t4/cxr/ekg   (per p/c from Friday pt c/o tired and weak)  HOWEVER, pt states as of sun he was \"feeling 100% better\" pt states he would still like to get testing to [facility] sure\" everything is ok.    Will send orders to [facility] per pt request.

## 2020-08-25 RX ORDER — AMIODARONE HYDROCHLORIDE 200 MG/1
200 TABLET ORAL DAILY
Qty: 30 TABLET | Refills: 11 | Status: SHIPPED | OUTPATIENT
Start: 2020-08-25 | End: 2020-09-14 | Stop reason: ALTCHOICE

## 2020-08-26 ENCOUNTER — TELEPHONE (OUTPATIENT)
Dept: CARDIOLOGY CLINIC | Age: 70
End: 2020-08-26

## 2020-08-31 ENCOUNTER — TELEPHONE (OUTPATIENT)
Dept: CARDIOLOGY CLINIC | Age: 70
End: 2020-08-31

## 2020-08-31 NOTE — TELEPHONE ENCOUNTER
Per pt request wanting to know what EF is   Will set up for [facility]   C/o stamina is down and sob   Per Dr. Cecil Pringle

## 2020-09-09 ENCOUNTER — HOSPITAL ENCOUNTER (OUTPATIENT)
Dept: NON INVASIVE DIAGNOSTICS | Age: 70
Discharge: HOME | End: 2020-09-09
Payer: COMMERCIAL

## 2020-09-09 LAB
LV EF: 28 %
LVEF MODALITY: NORMAL

## 2020-09-09 PROCEDURE — 93306 TTE W/DOPPLER COMPLETE: CPT

## 2020-09-14 ENCOUNTER — OFFICE VISIT (OUTPATIENT)
Dept: CARDIOLOGY CLINIC | Age: 70
End: 2020-09-14
Payer: COMMERCIAL

## 2020-09-14 VITALS
SYSTOLIC BLOOD PRESSURE: 110 MMHG | DIASTOLIC BLOOD PRESSURE: 70 MMHG | WEIGHT: 280 LBS | OXYGEN SATURATION: 95 % | BODY MASS INDEX: 42.57 KG/M2 | HEART RATE: 79 BPM

## 2020-09-14 PROCEDURE — 99214 OFFICE O/P EST MOD 30 MIN: CPT | Performed by: INTERNAL MEDICINE

## 2020-09-14 RX ORDER — FUROSEMIDE 20 MG/1
20 TABLET ORAL 2 TIMES DAILY
COMMUNITY
End: 2020-09-14 | Stop reason: SDUPTHER

## 2020-09-14 RX ORDER — SPIRONOLACTONE 25 MG/1
25 TABLET ORAL 2 TIMES DAILY
COMMUNITY
End: 2020-11-10 | Stop reason: SDUPTHER

## 2020-09-14 RX ORDER — FUROSEMIDE 20 MG/1
20 TABLET ORAL 2 TIMES DAILY
Qty: 180 TABLET | Refills: 3 | Status: SHIPPED | OUTPATIENT
Start: 2020-09-14 | End: 2021-08-27

## 2020-09-14 RX ORDER — AMIODARONE HYDROCHLORIDE 200 MG/1
200 TABLET ORAL DAILY
COMMUNITY
End: 2020-12-07 | Stop reason: SDUPTHER

## 2020-09-14 NOTE — PROGRESS NOTES
Ov DR Grey Elio   Pt c/o sob and fatigue  Weight gain 7-8lbs   leidy ankle edema   Dizziness when laying   Down. Uses cpap at night. occ headaches. Some palpitations   When looking at watch   HR up 100-120 with   Activity. C/o wheezing at times. All sx for past month. Had cxr done   And echo here   No chest pain. Wife had some  Fractured L1   L5 had surg in June   Laminectomy 2-4. Pt still taking care of wife. Daughter has clear cell  Carcinoma had hysterectomy. At [facility]. No chemo or radiation. Bedside echo done  Resting pulse ox 98%  Walked in chaudhari more sob  Pulse ox stayed 96%. Will increase lasix and   Aldactone to bid. Bmp in 1 week. Also decrease   Amiodarone to 5 times a  Week skipping Sunday   And Wednesday. Will stop altace   Start entresto 24/26. Will see in 6 weeks  with cbc,cmp tsh   Ekg.

## 2020-09-14 NOTE — PATIENT INSTRUCTIONS
Increase lasix and aldactone to twice a day  Lab in 1 week   Call us.     ;decrease amiodarone to 5 days a week skipping Sunday and Wednesday.      stop altace   Start entresto 24/26

## 2020-09-17 NOTE — PROGRESS NOTES
Marlyn Rothman M.D. [address] [place], [place] [address]Gavin   [phone]        2020        Enma Moura MD  [address] [facility], [address] [place] [address]    RE:   Primitivo Talley  :  1950    Dear Dr. Zac Knott:    CHIEF COMPLAINT:  1. Severe cardiomyopathy. 2.  Status post ablation for ventricular tachycardia by Dr. Alvin Berry on 2019.  3. Upgrade to biventricular ICD on 2020, by Dr. Alvin Berry. 4.  Severe LV dysfunction, EF initially felt to be in the 14% range although I felt it was in the 25% to 30% range. HISTORY OF PRESENT ILLNESS:  I had the pleasure of seeing Mr. Jasmine in our office on 2020. He had called us on , because of \"not feeling right. \"  His stamina is down, he was short of breath. He, therefore, wanted to see us for an evaluation. In preparation, we did testing including an echocardiogram as well as multiple labs. We had done testing on . His TSH was elevated at 14 and his thyroid is being adjusted by Dr. Zac Knott. He is under a fair amount of stress at home. His wife has had 2 compression fractures. His wife had surgery in  with laminectomy of L2-L4 by Dr. Little Eaton, but she still has marked back pain. Therefore, he has been essentially homebound when he is not working, taking care of her. He has not been exercising or walking. He complains of shortness of breath and fatigue. He has had a weight gain of approximately 7 to 8 pounds with bilateral pedal edema worsening. He uses a CPAP mask at night. He has some palpitations. His heart rate goes up to approximately 100 to 120 with activity. His resting pulse oximetry was 98%; when we walked him in the hallway, it was 96%. We did do an echocardiogram on 2020, that showed an EF of 25% to possibly 30%, with mild-to-moderate mitral regurgitation, his PA pressure estimated at 40 mmHg.   He had moderately dilated right atrium and right ventricle. He had been feeling better after his biventricular ICD; however, again since approximately , he has had worsening fatigue and shortness of breath. CARDIAC RISK FACTORS:  Known CAD:  Positive. Hypertension:  Positive. Hyperlipidemia:  Positive. Peripheral Vascular Disease:  Negative. Smoking:  Negative. Diabetes:  Positive. Other Family Members:  Positive. MEDICATIONS AT THIS TIME:  He is on Cordarone 200 mg daily, Eliquis 5 mg b.i.d., Lipitor 80 mg daily, aspirin 81 mg daily, Coreg 6.25 mg b.i.d., Advair 1 puff every 12 hours, Lasix 20 mg 1 daily, Amaryl 1 mg b.i.d., Synthroid 25 mcg daily, Glucophage 500 mg b.i.d., Altace 5 mg daily, Viagra p.r.n., Aldactone 25 mg daily, vitamin D 1000 units daily, Incruse Ellipta 1 puff daily. PAST MEDICAL AND SURGICAL HISTORY:  1. Type 2 diabetes. 2.  Sleep apnea, on a CPAP mask. 3.  Hypertension. 4.  Hyperlipidemia. 5.  Chronic renal insufficiency, which has been stable, with a creatinine in the 1.6 to 1.7 range. 6.  History of hypothyroidism. 7.  Status post ablation for ventricular tachycardia on 2019.  8. Upgrade to biventricular ICD on 2020.  9. History of atrial fibrillation, on amiodarone and Eliquis for atrial fibrillation. FAMILY HISTORY:  Mother  at 68 of Alzheimer's. Father  of an MI.    SOCIAL HISTORY:  He is 69years old, [facility] in [place], [place] in 2017. , 2 daughters, live in [place]. A 32-foot boat in [place]. One daughter is a nurse at [employer]. He has been under much stress with his wife having compression back fracture with surgery by Dr. Kerry Kawasaki but still in significant pain, able to do very little activity. His daughter at [facility] also had clear cell carcinoma with hysterectomy, with no chemotherapy or radiation needed. REVIEW OF SYSTEMS:  Cardiac as above.   Other systems reviewed including constitutional, eyes, ears, nose and throat, cardiovascular, respiratory, GI, , musculoskeletal, integumentary, neurologic, endocrine, hematologic and allergic/immunologic are negative except for described above. He has had approximately 8-pound weight gain. He has had more edema in both lower extremities. He has not been able to exercise because of being housebound with his wife. PHYSICAL EXAMINATION:  VITAL SIGNS:  His blood pressure was 110/70 with a heart rate of 79 and regular. Respiratory rate 18. O2 saturation 95%. Weight 280 pounds. GENERAL:  He is a pleasant 69-year-old gentleman. Denied pain. He was oriented to person, place and time. Answered questions appropriately. SKIN:  No unusual skin changes. HEENT:  The pupils are equally round and intact. Mucous membranes were dry. NECK:  No JVD. Good carotid pulses. No carotid bruits. No lymphadenopathy or thyromegaly. CARDIOVASCULAR EXAM:  S1 and S2 were normal.  No S3 or S4. Soft systolic blowing type murmur. No diastolic murmur. PMI was normal.  No lift, thrust, or pericardial friction rub. LUNGS:  Quite clear to auscultation and percussion. Few wheezes and crackles bilaterally. ABDOMEN:  Very protuberant. Soft and nontender. Could not feel liver, spleen, or aorta. EXTREMITIES:  He had 3+ edema in both lower extremities. NEUROLOGIC EXAM:  Unremarkable. PSYCHIATRIC EXAM:  Unremarkable. LABORATORY DATA:  From 08/28/2020, his TSH was 14.3 with a T4 of 13, T3 was 91. White count 7.43, hemoglobin 13.2 with a platelet count 328,124. Sodium 140, potassium 4.4, BUN 29, creatinine 1.78, which is about his baseline. His AST was 22, ALT was 29. Magnesium was 2.3. Hemoglobin A1c was 6.8. Differential was unremarkable. PSA was slightly increased at 1.51.     Echocardiogram on 09/09/2020, demonstrated an EF in the 25% to possibly 30% range with moderate-to-severely dilated left atrium and moderately dilated right-sided chambers, with thickened mitral valve leaflets and mild-to-moderate mitral regurgitation, moderate tricuspid regurgitation, PA pressure estimated at 40 mmHg. Chest x-ray on 08/24, showed mild edema with no effusions. EKG showed biventricular paced rhythm. We interrogated his ICD. He had 20 minutes of atrial fibrillation, otherwise has remained in sinus rhythm. He is ventricular pacing 90% of the time, atrial pacing 30% of the time. His OptiVol was increased; he had again 20 minutes of atrial fibrillation. He did have pulmonary function tests on 05/07/2020, which showed moderate obstruction with normal diffusion capacity. IMPRESSION:  1. Increased shortness of breath and loss of energy. 2.  Severe LV dysfunction, with EF in the 25% to 30% range by an echocardiogram on 09/09/2020, with his EF read as 14%, when done at [facility] on 06/12/2020.  3.  A 7 to 8 pound weight gain, with increasing edema at 3+ now and mild pulmonary edema, all consistent with mild CHF. 4.  History of atrial flutter with ablation by Dr. Tiffanie Menon on 07/29/2019, currently on amiodarone 200 mg 7 days a week, with him having a very few episodes of atrial fibrillation. 5.  EF of 25%, status post upgrade to biventricular ICD along with an atrial lead on 01/13/2020, with him ventricular pacing 90% of the time and atrial pacing 30% of the time. 6.  Reported EF of 14% at [facility] on 06/12/2020, which I read as 25% to 30% on echocardiogram on 09/09.  7.  Status post myocardial infarction on 10/19/2019, with a catheterization showing triple-vessel disease. 8.  Open heart surgery by Dr. Simran Thompson on 12/25/1999, with a radial bypass graft to the PDA and posterior ventricular branch of the right coronary artery with a free right internal mammary artery bypass graft to the diagonal and 2 branches of circumflex and a LIMA to the LAD.   9.  Catheterization on 03/04/2015, showed an occluded radial bypass graft to the right coronary artery as well as occluded right coronary artery. 10.  Initial ICD implanted on 11/26/2007, by Dr. Antoine Sebastian. 11.  Generator change on 12/03/2014, by Dr. Zulay Dorsey, placing an Evera XT. 12.  Biventricular ICD upgrade by Dr. Diana Johnston, placing a Medtronic Quad biventricular ICD on 01/13/2020. 13.  Chronic renal insufficiency, with creatinine in the 1.6 to 1.8 range. 14.  History of atrial flutter on 08/09/2018. 15. On thyroid replacement, managed by Dr. Jill Coyne. 17.  Non-insulin-dependent diabetes, with hemoglobin A1c of 6.8. PLAN:  1. Decrease amiodarone to 200 mg 5 days a week, skipping Wednesdays and Sundays. 2.  Increase Aldactone 25 mg and Lasix 20 mg to twice a day from daily dosing. 3.  Metabolic profile in 1 week to check BUN and creatinine and potassium. 4.  Stop Altace. 5.  Start Entresto 24/26 b.i.d.  6.  We will see in 6 months with a complete set of labs. DISCUSSION:  Mr. Trinity Sanchez has had mild CHF. He is more short of breath and does have increasing edema. Also, he has more deconditioning as he has been unable to exercise for the last 4 to 5 months. His EF is still severely impaired in the 25% to possibly 30% range. I think he is mildly fluid overloaded and we will increase his Lasix from 20 mg daily to 20 mg b.i.d. and his Aldactone from 25 mg daily to 25 mg b.i.d. His amiodarone can, of course, be contributing to his shortness of breath although his diffusion capacity was normal in 05/2020. However, we will decrease his amiodarone to 200 mg 5 days a week rather than 7 days a week. Per guidelines, we will switch from [facility] to [facility]. We will need to watch his BUN and creatinine carefully with Entresto and increase in diuretics and he will get a repeat BMP and CBC in 1 week. He would also be a candidate for SGLT2 inhibitor because of CHF and coronary artery disease.     I would like to institute Entresto first and consider the SGLT2 inhibitors after we have established appropriate dose with Entresto. When I see him in 6 weeks, if we can, we will increase Entresto to the intermediate dose and 6 weeks later increase to the highest dose if possible. He is already on beta-blocker for his cardiomyopathy. I have encouraged him to start his treadmill at home at least 3 minutes daily, increasing by 1 minute per week. He would like to be able to go to the gym, but this is not possible at this time with his wife. We will place him on full medical therapy, with switching him to [facility] and increasing his diuretics. Atrial fibrillation really is not contributing to his low cardiac output syndrome, which was surprising to me. Again, we will consider a SGLT2 inhibitor later for completeness. Thank you very much for allowing me the privilege of seeing Mr. Jasmine. If you have any questions on my thoughts, please do not hesitate to contact me.     Sincerely,        Damir Fry    D: 09/15/2020 4:05:20     T: 09/15/2020 12:41:34     [id number]  Job#: [id number]   Doc#: [id number]

## 2020-09-23 ENCOUNTER — TELEPHONE (OUTPATIENT)
Dept: CARDIOLOGY CLINIC | Age: 70
End: 2020-09-23

## 2020-09-23 NOTE — TELEPHONE ENCOUNTER
Pt called feeling much better   Lost 10lbs   Sob improved   Results lab given to ptCharly s
Initial (On Arrival)

## 2020-10-12 ENCOUNTER — TELEPHONE (OUTPATIENT)
Dept: CARDIOLOGY CLINIC | Age: 70
End: 2020-10-12

## 2020-10-12 NOTE — TELEPHONE ENCOUNTER
Ade at Dr. Daina Aguayo office left a message. She stated that Dr. Matt Thompson is planning an ablation for Jacquelyn Benes, but he would like to know if Dr. Angella Donnelly would prefer doing a stress test or a cath prior to the ablation to make sure that ischemia isn't the culprit for his condition. Please call Ade at [phone].

## 2020-10-21 LAB
ALBUMIN SERPL-MCNC: NORMAL G/DL
ALP BLD-CCNC: NORMAL U/L
ALT SERPL-CCNC: NORMAL U/L
ANION GAP SERPL CALCULATED.3IONS-SCNC: NORMAL MMOL/L
AST SERPL-CCNC: NORMAL U/L
BASOPHILS ABSOLUTE: NORMAL
BASOPHILS RELATIVE PERCENT: NORMAL
BILIRUB SERPL-MCNC: NORMAL MG/DL
BUN BLDV-MCNC: NORMAL MG/DL
CALCIUM SERPL-MCNC: NORMAL MG/DL
CHLORIDE BLD-SCNC: NORMAL MMOL/L
CO2: NORMAL
CREAT SERPL-MCNC: NORMAL MG/DL
EOSINOPHILS ABSOLUTE: NORMAL
EOSINOPHILS RELATIVE PERCENT: NORMAL
GFR CALCULATED: NORMAL
GLUCOSE BLD-MCNC: NORMAL MG/DL
HCT VFR BLD CALC: NORMAL %
HEMOGLOBIN: NORMAL
LYMPHOCYTES ABSOLUTE: NORMAL
LYMPHOCYTES RELATIVE PERCENT: NORMAL
MCH RBC QN AUTO: NORMAL PG
MCHC RBC AUTO-ENTMCNC: NORMAL G/DL
MCV RBC AUTO: NORMAL FL
MONOCYTES ABSOLUTE: NORMAL
MONOCYTES RELATIVE PERCENT: NORMAL
NEUTROPHILS ABSOLUTE: NORMAL
NEUTROPHILS RELATIVE PERCENT: NORMAL
PDW BLD-RTO: NORMAL %
PLATELET # BLD: NORMAL 10*3/UL
PMV BLD AUTO: NORMAL FL
POTASSIUM SERPL-SCNC: NORMAL MMOL/L
RBC # BLD: NORMAL 10*6/UL
SODIUM BLD-SCNC: NORMAL MMOL/L
TOTAL PROTEIN: NORMAL
TSH SERPL DL<=0.05 MIU/L-ACNC: NORMAL M[IU]/L
WBC # BLD: NORMAL 10*3/UL

## 2020-10-27 ENCOUNTER — HOSPITAL ENCOUNTER (OUTPATIENT)
Age: 70
Discharge: HOME | End: 2020-10-27
Payer: COMMERCIAL

## 2020-10-27 ENCOUNTER — OFFICE VISIT (OUTPATIENT)
Dept: CARDIOLOGY CLINIC | Age: 70
End: 2020-10-27
Payer: COMMERCIAL

## 2020-10-27 VITALS
HEART RATE: 83 BPM | BODY MASS INDEX: 39.53 KG/M2 | DIASTOLIC BLOOD PRESSURE: 60 MMHG | WEIGHT: 260 LBS | OXYGEN SATURATION: 95 % | SYSTOLIC BLOOD PRESSURE: 110 MMHG

## 2020-10-27 PROCEDURE — 99214 OFFICE O/P EST MOD 30 MIN: CPT | Performed by: INTERNAL MEDICINE

## 2020-10-27 PROCEDURE — 93289 INTERROG DEVICE EVAL HEART: CPT | Performed by: INTERNAL MEDICINE

## 2020-10-27 PROCEDURE — 93005 ELECTROCARDIOGRAM TRACING: CPT

## 2020-10-27 RX ORDER — LEVOTHYROXINE SODIUM 88 UG/1
88 CAPSULE ORAL DAILY
COMMUNITY

## 2020-10-28 LAB
EKG ATRIAL RATE: 81 BPM
EKG P-R INTERVAL: 194 MS
EKG Q-T INTERVAL: 458 MS
EKG QRS DURATION: 148 MS
EKG QTC CALCULATION (BAZETT): 528 MS
EKG R AXIS: 130 DEGREES
EKG T AXIS: -28 DEGREES
EKG VENTRICULAR RATE: 80 BPM

## 2020-10-28 PROCEDURE — 93010 ELECTROCARDIOGRAM REPORT: CPT | Performed by: INTERNAL MEDICINE

## 2020-10-29 NOTE — PROGRESS NOTES
two branches of the circumflex, EF of 30% to 35%. In 04/2007, he had multiple episodes of non-sustained ventricular tachycardia and his amiodarone was increased to 6 days per week. On 08/09/2018, EKG showed slow atrial flutter and I placed him on Xarelto and converted him on 09/10/2018. On 01/01/2019, he had shortness of breath and EF of 25%. I considered doing the catheterization, but held off because of renal insufficiency. In 03/2019, his QRS was more prolonged with a left bundle-branch block and I felt that he should be considered for a biventricular ICD. I sent him for EP evaluation to consider upgrading his ICD to an atrial lead and a left ventricular lead. He saw Dr. Kori Mehta who did not feel that he met criteria and therefore, he had an ablation by Dr. Tawana Elmore on 07/29/2019. He followed up with Dr. Tawana Elmore who upgraded his ICD on 01/13/2020, to a left ventricular lead and atrial lead. An echocardiogram was done on 06/12/2020, that showed EF of 40% with a mild-to-moderate mitral regurgitation. I saw him then on 07/16/2020. At that time, he actually looked quite good. He denied any chest pain or chest discomfort. I then brought him back on 09/09. He had been short of breath and I wanted to see him in evaluation. In preparation, I did an echocardiogram and multiple labs. His TSH was elevated at 14. His thyroid has been adjusted by Dr. Alis Price. His wife had two compression fractures and had surgery in June with L2-L4 laminectomy by Dr. Miller Anand, but still has marked back pain. He still had shortness of breath and fatigue. We did an echocardiogram on 09/09/2020, that showed an EF of 25% to 30% with mild-to-moderate mitral regurgitation. He was feeling better after his biventricular ICD; however, again, he had worsening shortness of breath. On 09/14/2020, we made some changes in his medications. We decreased his amiodarone to 200 mg 5 days a week.   We increased his Aldactone 25 mg and Lasix 20 mg to twice a day dosing. We also stopped Altace and started Entresto at 24/26 b.i.d. He was hospitalized at [facility] on 09/29, with essential ventricular storm. His amiodarone was increased to 200 mg daily. He saw Dr. Daryle Auerbach on 10/09/2020. At that time, he had also had episodes of ventricular tachycardia. He did have a shock. His device was reprogrammed off to a low detection rate, an ATP from burst to ramp. He also discussed VT ablation as he was having ventricular tachycardia despite amiodarone. This has been agreed upon and he is going to have an ablation on 11/25/2020, at [facility]. In preparation for that, we did a Lexiscan Cardiolite stress test at [facility]. The stress test showed inferior wall scar, which correlates with his anatomy with occluded right coronary artery. No reversible ischemia in any other segments. He overall feels good as I see him today. He has had no chest pain, no loss of energy. He has lost approximately 20 pounds. He is doing exercise. He has no unusual shortness of breath and again, he feels about the best he has felt for several years. CARDIAC RISK FACTORS:  Known CAD:  Positive. Hypertension:  Positive. Hyperlipidemia:  Positive. Peripheral Vascular Disease:  Negative. Smoking:  Negative. Diabetes:  Positive. Other Family Members:  Positive. MEDICATIONS:  At this time, he is on Cordarone 200 mg daily, Eliquis 5 mg b.i.d., aspirin 81 mg daily, Lipitor 80 mg daily, Coreg 6.25 mg b.i.d., Advair 250/50 q. 12 hours, Lasix 20 mg b.i.d., Amaryl 1 mg b.i.d., Synthroid 75 mcg daily, Glucophage 500 mg b.i.d., Entresto 24/26 one tablet b.i.d., Viagra 100 mg p.r.n., Aldactone 25 mg b.i.d., Incruse Ellipta one puff daily, vitamin D 1000 units daily. PAST MEDICAL AND SURGICAL HISTORY:  1. Type 2 diabetes. 2.  Sleep apnea, on a CPAP mask. 3.  Hypertension. 4.  Hyperlipidemia.   5.  Chronic mild renal insufficiency with Nail beds pink. Good cap refill. PULSES:  Bilateral symmetrical radial, brachial and carotid pulses. No carotid bruits. Good femoral and pedal pulses. NEUROLOGIC EXAM:  Within normal limits. PSYCHIATRIC EXAM:  Within normal limits. LABORATORY DATA:  From 10/21/2020, white count 6.75, hemoglobin 13.8, platelet count 625,876. His sodium is 138, potassium 4.1, glucose 129, BUN 26, creatinine 1.86, which is about his baseline,  Albumin is 4.0. Calcium 9.1. AST was 45, ALT was 40. His TSH was 7.66. Hemoglobin A1c was 6.8 on 09/30/2020. His ICD was interrogated today. His last episode of ventricular tachycardia was on 10/09/2020. Lexiscan Cardiolite stress test on 10/16/2020, showed inferior wall scar with no akinesia inferior wall with no ischemia identified with an EF at 23% to 27%. IMPRESSION:  1. Severe LV dysfunction with EF in the 25% to 30% range on an echocardiogram on 09/09/2020, with an EF that was read as at 14% at [facility] on 06/12/2020.  2.  Abnormal Lexiscan Myoview stress test at [facility] on 10/16/2020, showing inferior wall scar with no reversible ischemia consistent with his anatomy of an occluded right coronary artery. 3.  History of atrial flutter with ablation by Dr. Bryan Hernandez on 07/29/2019, currently on amiodarone 200 mg 7 days a week. 4.  Status post myocardial infarction on 10/19/1999, with catheterization showing triple vessel disease. 5.  Open heart surgery by Dr. Michael Galaviz on 12/25/1999, with a radial bypass graft to the PDA and posterior ventricular branch of the right coronary artery, a free right internal mammary artery bypass graft to the diagonal and two branches of the circumflex, and a LIMA to the LAD. 6.  Catheterization on 03/04/2015, showing an occluded radial bypass graft to the right coronary artery as well as occluded right coronary artery. 7.  ICD implanted on 11/26/2007, by Dr. Bhanu Mcwilliams.   8.  Generator change on 12/03/2014, by Dr. Jossy puckett an Evera XT. 9.  Biventricular ICD upgrade with atrial lead also placed by Dr. Yohana Moran placing Medtronic Quad biventricular ICD on 01/13/2020. 10.  Chronic renal insufficiency about baseline at 1.8. 11. On thyroid replacement, managed by Dr. Mina Hassan. 12.  Non-insulin-dependent diabetes with a hemoglobin A1c of 6.8. 13.  Functional class I to II, on his current medication schedule for his LV dysfunction. 14.  Ventricular storm after amiodarone was decreased to 5 days a week with it now at 7 days a week with no further episodes of ventricular tachycardia. 15.  Pending ablation for VT at [facility] on 11/25. PLAN:  1. Continue same medications. 2.  We will see him in 3 months. 3.  We would recommend placing him on an SGLT2 inhibitor for his cardio benefits including decreased mortality and less hospitalization for CHF. DISCUSSION:  Mary Lee overall is doing excellent. He has his rhythm disorder and did have a ventricular storm. He is pending ablation on 11/25. In preparation for this, we did do a Lexiscan Cardiolite stress test.  It showed inferior wall scar, which we would expect with his occluded right coronary artery and occluded vein graft to the right coronary artery. However, he had no reversible ischemia in any other segments. Therefore, I do not think there is a need for a cardiac catheterization before his ablation. He does feel excellent on his current medication therapy with the Entresto. I made no change in medications today. I would consider SGLT2 inhibitor for the added cardio protection. I will see him in 3 months in follow up. If he has any unusual shortness of breath, loss of energy, etc., I would be glad to see him earlier. Thank you very much for allowing me the privilege of seeing Mary Lee. If you have any questions on my thoughts, please do not hesitate to contact me.      Sincerely,      Alesha Lyn    D: 10/27/2020 15:07:33     T: 10/28/2020 11:19:24

## 2020-11-10 RX ORDER — SPIRONOLACTONE 25 MG/1
25 TABLET ORAL 2 TIMES DAILY
Qty: 28 TABLET | Refills: 0 | Status: SHIPPED | OUTPATIENT
Start: 2020-11-10 | End: 2021-04-20 | Stop reason: SDUPTHER

## 2020-11-10 RX ORDER — SPIRONOLACTONE 25 MG/1
25 TABLET ORAL 2 TIMES DAILY
Qty: 180 TABLET | Refills: 1 | Status: SHIPPED | OUTPATIENT
Start: 2020-11-10 | End: 2021-05-07

## 2020-12-07 RX ORDER — AMIODARONE HYDROCHLORIDE 200 MG/1
200 TABLET ORAL DAILY
Qty: 90 TABLET | Refills: 3 | Status: SHIPPED | OUTPATIENT
Start: 2020-12-07 | End: 2021-11-12

## 2020-12-16 ENCOUNTER — TELEPHONE (OUTPATIENT)
Dept: CARDIOLOGY CLINIC | Age: 70
End: 2020-12-16

## 2020-12-16 NOTE — TELEPHONE ENCOUNTER
Raudel Morgan called to state that he had an ablation Nov 25th and about 2 days after he had some bruising on this right ankle on the inside. He stated that is it still bruised and a little swollen and it hurts when he flexes it. He said that it  feels like a twisted ankle. He will come over anytime if Dr. Kinga Still wants to look at it or he can send a picture. Please advise.

## 2021-02-10 DIAGNOSIS — I48.91 ATRIAL FIBRILLATION WITH CONTROLLED VENTRICULAR RESPONSE (HCC): ICD-10-CM

## 2021-02-10 DIAGNOSIS — I10 ESSENTIAL HYPERTENSION: ICD-10-CM

## 2021-02-10 DIAGNOSIS — E55.9 VITAMIN D DEFICIENCY: ICD-10-CM

## 2021-02-10 DIAGNOSIS — Z95.810 ICD (IMPLANTABLE CARDIOVERTER-DEFIBRILLATOR) IN PLACE: ICD-10-CM

## 2021-02-10 DIAGNOSIS — E78.00 PURE HYPERCHOLESTEROLEMIA: ICD-10-CM

## 2021-02-10 DIAGNOSIS — I25.10 CORONARY ARTERY DISEASE INVOLVING NATIVE CORONARY ARTERY OF NATIVE HEART WITHOUT ANGINA PECTORIS: ICD-10-CM

## 2021-02-26 ENCOUNTER — PROCEDURE VISIT (OUTPATIENT)
Dept: CARDIOLOGY CLINIC | Age: 71
End: 2021-02-26
Payer: COMMERCIAL

## 2021-02-26 DIAGNOSIS — Z95.810 ICD (IMPLANTABLE CARDIOVERTER-DEFIBRILLATOR) IN PLACE: ICD-10-CM

## 2021-02-26 PROCEDURE — 93296 REM INTERROG EVL PM/IDS: CPT | Performed by: INTERNAL MEDICINE

## 2021-02-26 PROCEDURE — 93295 DEV INTERROG REMOTE 1/2/MLT: CPT | Performed by: INTERNAL MEDICINE

## 2021-02-26 NOTE — PROGRESS NOTES
Brady Asher. sent in [facility] pacemaker report. Reviewed by myself and Dr Karin Burgos.

## 2021-03-31 DIAGNOSIS — Z95.810 ICD (IMPLANTABLE CARDIOVERTER-DEFIBRILLATOR) IN PLACE: ICD-10-CM

## 2021-03-31 DIAGNOSIS — E55.9 VITAMIN D DEFICIENCY: ICD-10-CM

## 2021-03-31 DIAGNOSIS — E78.5 HYPERLIPIDEMIA, UNSPECIFIED HYPERLIPIDEMIA TYPE: ICD-10-CM

## 2021-03-31 DIAGNOSIS — I34.0 NONRHEUMATIC MITRAL VALVE REGURGITATION: Primary | ICD-10-CM

## 2021-03-31 DIAGNOSIS — R06.02 SOB (SHORTNESS OF BREATH): ICD-10-CM

## 2021-03-31 DIAGNOSIS — I25.10 CORONARY ARTERY DISEASE INVOLVING NATIVE CORONARY ARTERY OF NATIVE HEART WITHOUT ANGINA PECTORIS: ICD-10-CM

## 2021-03-31 DIAGNOSIS — Z01.812 ENCOUNTER FOR PREPROCEDURE SCREENING LABORATORY TESTING FOR COVID-19: ICD-10-CM

## 2021-03-31 DIAGNOSIS — Z20.822 ENCOUNTER FOR PREPROCEDURE SCREENING LABORATORY TESTING FOR COVID-19: ICD-10-CM

## 2021-03-31 DIAGNOSIS — I50.9 CHRONIC CONGESTIVE HEART FAILURE, UNSPECIFIED HEART FAILURE TYPE (HCC): ICD-10-CM

## 2021-03-31 DIAGNOSIS — E11.9 TYPE 2 DIABETES MELLITUS WITHOUT COMPLICATION, WITHOUT LONG-TERM CURRENT USE OF INSULIN (HCC): ICD-10-CM

## 2021-04-14 ENCOUNTER — HOSPITAL ENCOUNTER (OUTPATIENT)
Age: 71
Discharge: HOME | End: 2021-04-14
Payer: COMMERCIAL

## 2021-04-14 ENCOUNTER — HOSPITAL ENCOUNTER (OUTPATIENT)
Dept: PREADMISSION TESTING | Age: 71
Setting detail: SPECIMEN
Discharge: HOME | End: 2021-04-14
Payer: COMMERCIAL

## 2021-04-14 ENCOUNTER — HOSPITAL ENCOUNTER (OUTPATIENT)
Dept: PULMONOLOGY | Age: 71
Discharge: HOME | End: 2021-04-14
Payer: COMMERCIAL

## 2021-04-14 ENCOUNTER — HOSPITAL ENCOUNTER (OUTPATIENT)
Age: 71
Discharge: HOME | End: 2021-04-16
Payer: COMMERCIAL

## 2021-04-14 ENCOUNTER — HOSPITAL ENCOUNTER (OUTPATIENT)
Dept: GENERAL RADIOLOGY | Age: 71
Discharge: HOME | End: 2021-04-16
Payer: COMMERCIAL

## 2021-04-14 DIAGNOSIS — E11.9 TYPE 2 DIABETES MELLITUS WITHOUT COMPLICATION, WITHOUT LONG-TERM CURRENT USE OF INSULIN (HCC): ICD-10-CM

## 2021-04-14 DIAGNOSIS — I34.0 NONRHEUMATIC MITRAL VALVE REGURGITATION: ICD-10-CM

## 2021-04-14 DIAGNOSIS — R53.83 FATIGUE, UNSPECIFIED TYPE: ICD-10-CM

## 2021-04-14 DIAGNOSIS — R06.02 SOB (SHORTNESS OF BREATH): ICD-10-CM

## 2021-04-14 DIAGNOSIS — R53.1 WEAK: ICD-10-CM

## 2021-04-14 DIAGNOSIS — E55.9 VITAMIN D DEFICIENCY: ICD-10-CM

## 2021-04-14 DIAGNOSIS — I50.9 CHRONIC CONGESTIVE HEART FAILURE, UNSPECIFIED HEART FAILURE TYPE (HCC): ICD-10-CM

## 2021-04-14 DIAGNOSIS — Z95.810 ICD (IMPLANTABLE CARDIOVERTER-DEFIBRILLATOR) IN PLACE: ICD-10-CM

## 2021-04-14 DIAGNOSIS — I25.10 CORONARY ARTERY DISEASE INVOLVING NATIVE CORONARY ARTERY OF NATIVE HEART WITHOUT ANGINA PECTORIS: ICD-10-CM

## 2021-04-14 DIAGNOSIS — I48.91 ATRIAL FIBRILLATION WITH CONTROLLED VENTRICULAR RESPONSE (HCC): ICD-10-CM

## 2021-04-14 LAB
ABSOLUTE EOS #: 0.1 K/UL (ref 0–0.4)
ABSOLUTE IMMATURE GRANULOCYTE: ABNORMAL K/UL (ref 0–0.3)
ABSOLUTE LYMPH #: 1.2 K/UL (ref 1–4.8)
ABSOLUTE MONO #: 0.8 K/UL (ref 0–1)
ALBUMIN SERPL-MCNC: 4.3 G/DL (ref 3.5–5.2)
ALBUMIN/GLOBULIN RATIO: ABNORMAL (ref 1–2.5)
ALP BLD-CCNC: 90 U/L (ref 40–129)
ALT SERPL-CCNC: 21 U/L (ref 5–41)
ANION GAP SERPL CALCULATED.3IONS-SCNC: 8 MMOL/L (ref 9–17)
AST SERPL-CCNC: 19 U/L
BASOPHILS # BLD: 0 % (ref 0–2)
BASOPHILS ABSOLUTE: 0 K/UL (ref 0–0.2)
BILIRUB SERPL-MCNC: 0.51 MG/DL (ref 0.3–1.2)
BUN BLDV-MCNC: 29 MG/DL (ref 8–23)
BUN/CREAT BLD: 17 (ref 9–20)
CALCIUM SERPL-MCNC: 9.8 MG/DL (ref 8.6–10.4)
CHLORIDE BLD-SCNC: 102 MMOL/L (ref 98–107)
CO2: 28 MMOL/L (ref 20–31)
CREAT SERPL-MCNC: 1.67 MG/DL (ref 0.7–1.2)
DIFFERENTIAL TYPE: YES
EOSINOPHILS RELATIVE PERCENT: 2 % (ref 0–5)
GFR AFRICAN AMERICAN: 49 ML/MIN
GFR NON-AFRICAN AMERICAN: 41 ML/MIN
GFR SERPL CREATININE-BSD FRML MDRD: ABNORMAL ML/MIN/{1.73_M2}
GFR SERPL CREATININE-BSD FRML MDRD: ABNORMAL ML/MIN/{1.73_M2}
GLUCOSE BLD-MCNC: 71 MG/DL (ref 70–99)
HCT VFR BLD CALC: 40.6 % (ref 41–53)
HEMOGLOBIN: 13.3 G/DL (ref 13.5–17.5)
IMMATURE GRANULOCYTES: ABNORMAL %
LYMPHOCYTES # BLD: 17 % (ref 13–44)
MAGNESIUM: 2.3 MG/DL (ref 1.6–2.6)
MCH RBC QN AUTO: 32.6 PG (ref 26–34)
MCHC RBC AUTO-ENTMCNC: 32.7 G/DL (ref 31–37)
MCV RBC AUTO: 99.7 FL (ref 80–100)
MONOCYTES # BLD: 11 % (ref 5–9)
NRBC AUTOMATED: ABNORMAL PER 100 WBC
PDW BLD-RTO: 15.3 % (ref 12.1–15.2)
PLATELET # BLD: 199 K/UL (ref 140–450)
PLATELET ESTIMATE: ABNORMAL
PMV BLD AUTO: ABNORMAL FL (ref 6–12)
POTASSIUM SERPL-SCNC: 4.8 MMOL/L (ref 3.7–5.3)
RBC # BLD: 4.07 M/UL (ref 4.5–5.9)
RBC # BLD: ABNORMAL 10*6/UL
SARS-COV-2, RAPID: NOT DETECTED
SEG NEUTROPHILS: 70 % (ref 39–75)
SEGMENTED NEUTROPHILS ABSOLUTE COUNT: 5 K/UL (ref 2.1–6.5)
SODIUM BLD-SCNC: 138 MMOL/L (ref 135–144)
SPECIMEN DESCRIPTION: NORMAL
TOTAL PROTEIN: 7 G/DL (ref 6.4–8.3)
TSH SERPL DL<=0.05 MIU/L-ACNC: 6.3 MIU/L (ref 0.3–5)
WBC # BLD: 7.2 K/UL (ref 3.5–11)
WBC # BLD: ABNORMAL 10*3/UL

## 2021-04-14 PROCEDURE — 6370000000 HC RX 637 (ALT 250 FOR IP): Performed by: INTERNAL MEDICINE

## 2021-04-14 PROCEDURE — 85025 COMPLETE CBC W/AUTO DIFF WBC: CPT

## 2021-04-14 PROCEDURE — 82306 VITAMIN D 25 HYDROXY: CPT

## 2021-04-14 PROCEDURE — 94060 EVALUATION OF WHEEZING: CPT

## 2021-04-14 PROCEDURE — 36415 COLL VENOUS BLD VENIPUNCTURE: CPT

## 2021-04-14 PROCEDURE — 94729 DIFFUSING CAPACITY: CPT

## 2021-04-14 PROCEDURE — 84439 ASSAY OF FREE THYROXINE: CPT

## 2021-04-14 PROCEDURE — 83036 HEMOGLOBIN GLYCOSYLATED A1C: CPT

## 2021-04-14 PROCEDURE — 84443 ASSAY THYROID STIM HORMONE: CPT

## 2021-04-14 PROCEDURE — 80053 COMPREHEN METABOLIC PANEL: CPT

## 2021-04-14 PROCEDURE — 87635 SARS-COV-2 COVID-19 AMP PRB: CPT

## 2021-04-14 PROCEDURE — 93005 ELECTROCARDIOGRAM TRACING: CPT

## 2021-04-14 PROCEDURE — 83735 ASSAY OF MAGNESIUM: CPT

## 2021-04-14 PROCEDURE — 94726 PLETHYSMOGRAPHY LUNG VOLUMES: CPT

## 2021-04-14 PROCEDURE — 71046 X-RAY EXAM CHEST 2 VIEWS: CPT

## 2021-04-14 PROCEDURE — C9803 HOPD COVID-19 SPEC COLLECT: HCPCS

## 2021-04-14 RX ORDER — ALBUTEROL SULFATE 90 UG/1
2 AEROSOL, METERED RESPIRATORY (INHALATION) ONCE
Status: COMPLETED | OUTPATIENT
Start: 2021-04-14 | End: 2021-04-14

## 2021-04-14 RX ADMIN — ALBUTEROL SULFATE 2 PUFF: 90 AEROSOL, METERED RESPIRATORY (INHALATION) at 14:06

## 2021-04-15 LAB
EKG ATRIAL RATE: 67 BPM
EKG P AXIS: 8 DEGREES
EKG Q-T INTERVAL: 472 MS
EKG QRS DURATION: 136 MS
EKG QTC CALCULATION (BAZETT): 498 MS
EKG R AXIS: 137 DEGREES
EKG T AXIS: -23 DEGREES
EKG VENTRICULAR RATE: 67 BPM
ESTIMATED AVERAGE GLUCOSE: 137 MG/DL
HBA1C MFR BLD: 6.4 % (ref 4–6)
THYROXINE, FREE: 1.75 NG/DL (ref 0.93–1.7)
VITAMIN D 25-HYDROXY: 49.5 NG/ML (ref 30–100)

## 2021-04-15 PROCEDURE — 94729 DIFFUSING CAPACITY: CPT | Performed by: INTERNAL MEDICINE

## 2021-04-15 PROCEDURE — 94060 EVALUATION OF WHEEZING: CPT | Performed by: INTERNAL MEDICINE

## 2021-04-15 PROCEDURE — 93010 ELECTROCARDIOGRAM REPORT: CPT | Performed by: INTERNAL MEDICINE

## 2021-04-15 NOTE — PROCEDURES
[place] [address]                               PULMONARY FUNCTION    PATIENT NAME: Ban Durand                        :        1950  MED REC NO:   [id number]                              ROOM:  ACCOUNT NO:   [de-identified]                           ADMIT DATE: 2021  PROVIDER:     Ramiro Oreilly    DATE OF PROCEDURE:  2021    PULMONARY FUNCTION TESTS WITH BRONCHODILATOR AND DLCO    ATTENDING PHYSICIAN:  Zaira Nguyen MD    REASON FOR TEST:  High-risk medication. SUMMARY:  1. Respiratory therapist reports the patient's effort as being good. 2.  The forced vital capacity is decreased at 63% of predicted. 3.  The forced exhaled volume in 1 second is decreased at 58% of  predicted. 4.  The forced exhaled volume in 1 second/forced vital capacity is  normal at 91% of predicted. 5.  No significant improvement was seen after one-time dose of  bronchodilator. 6.  Total lung capacity is decreased at 64% of predicted. 7. DLCO is decreased at 56% of predicted. IMPRESSION:  1.  Pulmonary function test results suggest a moderate restrictive lung  disease. With a decrease in the DLCO and a decrease in the inspiratory  capacity out of proportion to the expiratory reserve volume, this  suggests an intrapulmonary restriction. 2.  The DLCO is moderately decreased.         MO OREILLY    D: 04/15/2021 7:07:05       T: 04/15/2021 11:47:53     [email or web address]  Job#: [id number]     Doc#: [id number]    CC:

## 2021-04-20 ENCOUNTER — OFFICE VISIT (OUTPATIENT)
Dept: CARDIOLOGY CLINIC | Age: 71
End: 2021-04-20
Payer: COMMERCIAL

## 2021-04-20 VITALS
DIASTOLIC BLOOD PRESSURE: 70 MMHG | HEART RATE: 90 BPM | OXYGEN SATURATION: 95 % | WEIGHT: 266 LBS | BODY MASS INDEX: 40.45 KG/M2 | SYSTOLIC BLOOD PRESSURE: 118 MMHG

## 2021-04-20 DIAGNOSIS — I25.10 CORONARY ARTERY DISEASE INVOLVING NATIVE CORONARY ARTERY OF NATIVE HEART WITHOUT ANGINA PECTORIS: Primary | ICD-10-CM

## 2021-04-20 DIAGNOSIS — E11.9 TYPE 2 DIABETES MELLITUS WITHOUT COMPLICATION, WITHOUT LONG-TERM CURRENT USE OF INSULIN (HCC): ICD-10-CM

## 2021-04-20 DIAGNOSIS — Z95.810 ICD (IMPLANTABLE CARDIOVERTER-DEFIBRILLATOR) IN PLACE: ICD-10-CM

## 2021-04-20 DIAGNOSIS — E55.9 VITAMIN D DEFICIENCY: ICD-10-CM

## 2021-04-20 DIAGNOSIS — R06.02 SOB (SHORTNESS OF BREATH): ICD-10-CM

## 2021-04-20 DIAGNOSIS — E78.5 HYPERLIPIDEMIA, UNSPECIFIED HYPERLIPIDEMIA TYPE: ICD-10-CM

## 2021-04-20 PROCEDURE — 99214 OFFICE O/P EST MOD 30 MIN: CPT | Performed by: INTERNAL MEDICINE

## 2021-04-20 PROCEDURE — 93289 INTERROG DEVICE EVAL HEART: CPT | Performed by: INTERNAL MEDICINE

## 2021-04-20 RX ORDER — UBIDECARENONE 60 MG
CAPSULE ORAL DAILY
COMMUNITY
End: 2022-10-17

## 2021-04-20 RX ORDER — ALBUTEROL SULFATE 90 UG/1
2 AEROSOL, METERED RESPIRATORY (INHALATION) EVERY 6 HOURS PRN
COMMUNITY
Start: 2021-02-22 | End: 2022-02-22

## 2021-04-20 RX ORDER — ZINC SULFATE 50(220)MG
220 CAPSULE ORAL DAILY
COMMUNITY
End: 2022-08-09

## 2021-04-25 NOTE — PROGRESS NOTES
Hannah Reyna M.D. [address] [place] [address], [facility]  [phone]        2021        Hortensia Jim MD  [address] [place] [address]    RE:   Elise Murray  :  1950    Dear Dr. Juan Francisco Iniguez:    CHIEF COMPLAINT:  1. Severe cardiomyopathy, EF in the 25% to 30% range. 2.  Status post biventricular ICD placed on 2020.  3.  Ablation by Dr. Jaya Gimenez for ventricular tachycardia on 2020. HISTORY OF PRESENT ILLNESS:  I had the pleasure of seeing Leigha Tejeda in the office on 2021. He is a pleasant 79-year-old  who has a very complex cardiac history. He had ST-elevation myocardial infarction on 10/19/1999, given tPA. A catheterization showed severe triple-vessel disease and he had subsequent open heart surgery by Dr. Lyle Lopez on 1999, with a radial bypass graft sequentially to the PDA and posterior ventricular branch of the right coronary artery, a free right internal mammary artery bypass graft to the diagonal, anterior branch of the circumflex and a left internal mammary artery bypass graft to the LAD. On 2007, a catheterization showed patent bypass grafts with an occluded posterolateral branch of the right coronary artery, EF of 30% to 35%, and a single-lead ICD was placed on 2007, by Dr. Gene Max. On 2014, he had nonsustained ventricular tachycardia and had defibrillation. It was recommended that he go on long-term Cordarone by Dr. Camacho Ko. His generator was at [facility] and ICD was changed at that time and placed an Evera Medtronic single-lead pacemaker by Dr. Camacho Ko. I did a catheterization on 2015, that showed occluded radial bypass graft to the right coronary artery, with an occluded right coronary artery.   He had a patent LIMA to the LAD and a patent right internal mammary artery to the diagonal and 2 branches of the circumflex, with an EF of 30% to 35%.    His amiodarone was increased to 6 days per week in 04/2007 because of nonsustained ventricular tachycardia. On 08/09/2018, he developed more shortness of breath and an EKG showed slow atrial flutter and he was placed on Xarelto and I cardioverted him on 09/10/2018. On 01/01/2019, he had more shortness of breath and his EF was 25%. We were considering doing a catheterization, but he had renal insufficiency and we did not do the catheterization at that time. In 03/2019, his QRS was prolonged with a left bundle-branch block and I felt that he should be considered for a biventricular ICD. I sent him for an EP evaluation to consider upgrading his ICD to an atrial lead and a left ventricular lead, and he saw Dr. Darylene Rong who did not feel that he met criteria, and therefore, he rather had ablation by Dr. Nain Collazo on 07/29/2019. He then followed up again with Dr. Nain Collazo who upgraded his ICD on 01/13/2020, placing a left ventricular lead and an atrial lead. On 09/09/2020, he had shortness of breath. I did an echocardiogram and his TSH was elevated at 14. His thyroid had been adjusted by Dr. Clarisse Betancourt. He was going through personal difficulty with his wife having 2 compression fractures with surgery on her back in 06/2020 by Dr. Danyelle Martell with laminectomy. An echocardiogram on 09/09/2020, showed an EF of 25% to 30%. He was feeling better after his biventricular ICD but had worsening shortness of breath shortly thereafter. On 09/14/2020, we made changes in his medications, with decreasing his amiodarone to 200 mg 5 days a week. We started Entresto at 24/26 b.i.d. and increased his Aldactone and Lasix 20 mg daily to 20 mg b.i.d. He was hospitalized at [facility] on 09/29/2020 with ventricular storm and his amiodarone was increased to 200 mg daily. He saw Dr. Nain Collazo on 10/09/2020, where he was having episodes of ventricular tachycardia.   A VT ablation was discussed and was done on 11/25/[facility], at [facility]. In preparation for that, we did a Lexiscan Cardiolite stress test that showed inferior wall scar, with no reversible ischemia. He did have his ablation and has done well since that time. His pedal edema has markedly improved. He is exercising on a regular basis on the treadmill. On 03/09, he went to [facility]. When he started to come back on 03/18/2021, he stopped in [place] and had chest pain. He went to ER, his troponin was negative and he was discharged. He had been golfing quite hard and felt it was musculoskeletal in etiology. He has had no further chest pain or chest discomfort. Has occasional dizziness but it is rare. He has had no pedal edema and he is watching his salt intake. His wife passed away in 01/2021 after prolonged illness and a disastrous hospitalization. He has had no syncope or near syncope. We interrogated his ICD and he has had no arrhythmias, no ventricular tachycardia, and no intervention. He looks good as I see him today and he is exercising on a routine basis. CARDIAC RISK FACTORS:  Known CAD:  Positive. Hypertension:  Positive. Hyperlipidemia:  Positive. Peripheral Vascular Disease:  Negative. Smoking:  Negative. Diabetes:  Positive. Other Family Members:  Positive. MEDICATIONS AT HOME:  He is on amiodarone 200 mg daily, Eliquis 5 mg b.i.d., aspirin 81 mg daily, Lipitor 80 mg daily, Coreg 6.25 mg b.i.d., CoQ10 daily, Allegra p.r.n., Advair 250/50 one puff every 12 hours, Lasix 20 mg b.i.d., Amaryl 1 mg b.i.d., levothyroxine 88 mcg daily, metformin 500 mg b.i.d., Entresto 24/26 b.i.d., Viagra p.r.n., Aldactone 25 mg b.i.d., Incruse Ellipta 1 puff daily, vitamin D 1000 units daily, zinc 220 mg daily. PAST MEDICAL AND SURGICAL HISTORY:  1. Type 2 diabetes, well controlled. 2.  Sleep apnea, on a CPAP mask. 3.  Hypertension, well controlled. 4.  Hyperlipidemia.   5.  Mild chronic renal insufficiency, with his creatinine about at baseline at 1.67.  6.  He has hypothyroidism, on replacement. 7.  Getting ablation for ventricular tachycardia on 2019, and upgrade to ICD on 2020.  8.  V-tach ablation on 2020, at [facility] with no further episode of ventricular tachycardia. 9.  Atrial fibrillation, controlled on amiodarone for VT and atrial fibrillation and on Eliquis. FAMILY HISTORY:  Mother  of Alzheimer's at 68. Father  of MI.    SOCIAL HISTORY:  He is 69years old, [facility] in [place] in 2017. , 2 daughters who live in [place] [facility]. He has a 32-foot boat in [place]. One daughter is a nurse at [employer]. His daughter at [facility] had clear cell carcinoma with hysterectomy, no chemotherapy or radiation. His wife had severe back pain, status post surgery with a subsequent abscess infection and passed away in [facility] in 2021 after prolonged hospitalization. He does not smoke or drink alcohol. REVIEW OF SYSTEMS:  Cardiac as above. Other systems reviewed including constitutional, eyes, ears, nose and throat, cardiovascular, respiratory, GI, , musculoskeletal, integumentary, neurologic, endocrine, hematologic and allergic/immunologic are negative except for what is described above. No weight loss or weight gain. No change in bowel habits, no blood in stools. No fevers, sweats or chills. PHYSICAL EXAMINATION:  VITAL SIGNS:  His blood pressure was 118/70 with a heart rate of 90 and regular. Respiratory rate 18. O2 saturation 95%. Weight 266 pounds. GENERAL:  He is a pleasant 78-year-old gentleman. Denied pain. He was oriented to person, place and time. Answered questions appropriately. SKIN:  No unusual skin changes. HEENT:  The pupils are equally round and reactive to light and accommodation. Extraocular movements were intact. Mucous membranes were dry. NECK:  No JVD. Good carotid pulses. No carotid bruits.   No lymphadenopathy or thyromegaly. CARDIOVASCULAR EXAM:  S1 and S2 were normal.  No S3 or S4. Soft systolic blowing type murmur. No diastolic murmur. PMI was normal.  No lift, thrust, or pericardial friction rub. LUNGS:  Quite clear to auscultation and percussion. ABDOMEN:  Soft and nontender. Good bowel sounds. EXTREMITIES:  Good femoral pulses. Good pedal pulses. Mild pedal edema. Skin was warm and dry. No calf tenderness. Nail beds pink. Good cap refill. PULSES:  Bilateral symmetrical radial, brachial and carotid pulses. No carotid bruits. Good femoral and pedal pulses. NEUROLOGIC EXAM:  Within normal limits. PSYCHIATRIC EXAM:  Within normal limits. LABORATORY DATA:  From 04/14/2021, sodium was 138, potassium 4.8, BUN 29, creatinine 1.67, which is about his baseline, with GFR of 41. Magnesium was 2.3, glucose 71, calcium 9.8. His ALT was 21, AST was 19. Hemoglobin A1c was excellent at 6.4. His TSH was 6.3, with a free thyroxine mildly elevated at 1.75. Vitamin D was 49.5. White count 7.2, hemoglobin 15.3, with platelet count 107,560. His ICD was interrogated and is still at beginning of life. He has had no episodes of ventricular tachycardia. He did have pulmonary function tests done at [facility] on 04/14/2021. His pulmonary function tests showed moderate restrictive lung disease with a decrease in DLCO and decrease in inspiratory capacity out of proportion that suggested intrapulmonary restriction. DLCO was moderately decreased. IMPRESSION:  1. Severe LV dysfunction with EF in the 25% to 30% range on an echocardiogram on 09/09/2020.  2.  Abnormal Lexiscan Cardiolite stress test on 10/16/2020, that showed inferior wall scar with no reversible ischemia consistent with his anatomy of an occluded right coronary artery. 3.  History of atrial flutter with ablation by Dr. Robert Brenner on 07/29/2019.  4.  Myocardial infarction on 10/19/1999, with a catheterization showing triple vessel disease.   5.  Open doing well. He did have an episode of chest pain in [place] on 03/18/2021, but his enzymes were negative and this was most likely musculoskeletal.    Ever since being on Entresto and Aldactone along with Lasix and Coreg, his symptoms have markedly improved. He is now exercising 15 to 20 minutes 3 to 4 days per week. I would like to slowly increase both his Coreg and his Lilly Ace if possible. Currently, his blood pressure is in the 110 range and I did not make any changes. However, when I see him in August, we will consider increasing his Coreg to 12.5 mg b.i.d. if his pressure will allow. Long-term, it would be helpful if we are able to increase both his Entresto and his Coreg to their maximum dose. His fluid status is good, and I made no changes in his Lasix or his Aldactone. Again, I would consider placing him on SGLT2 inhibitor for the cardiac benefits as well as for his diabetes. We did do pulmonary function tests, which showed restrictive lung disease with mild-to-moderately decreased DLCO. There is no evidence of amiodarone-induced fibrosis at this time. We will also continue yearly ophthalmologic examinations. I will see him in August.  We will repeat an echocardiogram before I see him in August.  We will address decreasing his amiodarone to 5 days per week as well as increasing his Coreg at that time. He is seeing Dr. Nadira Collins, who will address the SGLT2 inhibitor therapy. Thank you very much for allowing me the privilege of seeing Quynh Cox. If you have any questions on my thoughts, please do not hesitate to contact me.      Sincerely,        Megan Alatorre    D: 04/21/2021 22:54:24     T: 04/22/2021 2:55:42     [email or web address]  Job#: [id number]   Doc#: [id number]     <>

## 2021-05-07 RX ORDER — SPIRONOLACTONE 25 MG/1
TABLET ORAL
Qty: 180 TABLET | Refills: 1 | Status: SHIPPED | OUTPATIENT
Start: 2021-05-07 | End: 2021-11-02

## 2021-05-26 ENCOUNTER — TELEPHONE (OUTPATIENT)
Dept: CARDIOLOGY CLINIC | Age: 71
End: 2021-05-26

## 2021-05-26 NOTE — TELEPHONE ENCOUNTER
Param Brown called to state that he is having a colonoscopy June 1st and his doctor told him to stop his Eliquis 3 days prior and Aspirin 5 days prior to procedure. He just wanted Dr Anaya Johnson to know and no need to call him as long as he is okay with that.

## 2021-06-01 RX ORDER — CARVEDILOL 6.25 MG/1
TABLET ORAL
Qty: 180 TABLET | Refills: 3 | Status: SHIPPED | OUTPATIENT
Start: 2021-06-01 | End: 2022-05-17

## 2021-06-28 RX ORDER — APIXABAN 5 MG/1
TABLET, FILM COATED ORAL
Qty: 180 TABLET | Refills: 3 | Status: SHIPPED | OUTPATIENT
Start: 2021-06-28 | End: 2022-07-05

## 2021-08-05 RX ORDER — ATORVASTATIN CALCIUM 80 MG/1
TABLET, FILM COATED ORAL
Qty: 90 TABLET | Refills: 3 | Status: SHIPPED | OUTPATIENT
Start: 2021-08-05 | End: 2022-07-25

## 2021-08-10 ENCOUNTER — HOSPITAL ENCOUNTER (OUTPATIENT)
Age: 71
Discharge: HOME | End: 2021-08-10
Payer: COMMERCIAL

## 2021-08-10 ENCOUNTER — OFFICE VISIT (OUTPATIENT)
Dept: CARDIOLOGY CLINIC | Age: 71
End: 2021-08-10
Payer: COMMERCIAL

## 2021-08-10 ENCOUNTER — HOSPITAL ENCOUNTER (OUTPATIENT)
Dept: NON INVASIVE DIAGNOSTICS | Age: 71
Discharge: HOME | End: 2021-08-10
Payer: COMMERCIAL

## 2021-08-10 VITALS
HEART RATE: 82 BPM | OXYGEN SATURATION: 94 % | BODY MASS INDEX: 39.99 KG/M2 | WEIGHT: 263 LBS | DIASTOLIC BLOOD PRESSURE: 60 MMHG | SYSTOLIC BLOOD PRESSURE: 110 MMHG

## 2021-08-10 DIAGNOSIS — I25.10 CORONARY ARTERY DISEASE INVOLVING NATIVE CORONARY ARTERY OF NATIVE HEART WITHOUT ANGINA PECTORIS: ICD-10-CM

## 2021-08-10 DIAGNOSIS — E78.5 HYPERLIPIDEMIA, UNSPECIFIED HYPERLIPIDEMIA TYPE: ICD-10-CM

## 2021-08-10 DIAGNOSIS — Z95.810 ICD (IMPLANTABLE CARDIOVERTER-DEFIBRILLATOR) IN PLACE: ICD-10-CM

## 2021-08-10 DIAGNOSIS — E11.9 TYPE 2 DIABETES MELLITUS WITHOUT COMPLICATION, WITHOUT LONG-TERM CURRENT USE OF INSULIN (HCC): Primary | ICD-10-CM

## 2021-08-10 DIAGNOSIS — E55.9 VITAMIN D DEFICIENCY: ICD-10-CM

## 2021-08-10 DIAGNOSIS — I10 ESSENTIAL HYPERTENSION: ICD-10-CM

## 2021-08-10 DIAGNOSIS — R06.02 SOB (SHORTNESS OF BREATH): ICD-10-CM

## 2021-08-10 DIAGNOSIS — E11.9 TYPE 2 DIABETES MELLITUS WITHOUT COMPLICATION, WITHOUT LONG-TERM CURRENT USE OF INSULIN (HCC): ICD-10-CM

## 2021-08-10 DIAGNOSIS — I34.0 NONRHEUMATIC MITRAL VALVE REGURGITATION: ICD-10-CM

## 2021-08-10 LAB
ABSOLUTE EOS #: 0.1 K/UL (ref 0–0.4)
ABSOLUTE IMMATURE GRANULOCYTE: ABNORMAL K/UL (ref 0–0.3)
ABSOLUTE LYMPH #: 0.9 K/UL (ref 1–4.8)
ABSOLUTE MONO #: 0.6 K/UL (ref 0–1)
ALBUMIN SERPL-MCNC: 4.3 G/DL (ref 3.5–5.2)
ALBUMIN/GLOBULIN RATIO: ABNORMAL (ref 1–2.5)
ALP BLD-CCNC: 84 U/L (ref 40–129)
ALT SERPL-CCNC: 20 U/L (ref 5–41)
ANION GAP SERPL CALCULATED.3IONS-SCNC: 10 MMOL/L (ref 9–17)
AST SERPL-CCNC: 21 U/L
BASOPHILS # BLD: 0 % (ref 0–2)
BASOPHILS ABSOLUTE: 0 K/UL (ref 0–0.2)
BILIRUB SERPL-MCNC: 0.99 MG/DL (ref 0.3–1.2)
BUN BLDV-MCNC: 26 MG/DL (ref 8–23)
BUN/CREAT BLD: 16 (ref 9–20)
CALCIUM SERPL-MCNC: 9.6 MG/DL (ref 8.6–10.4)
CHLORIDE BLD-SCNC: 102 MMOL/L (ref 98–107)
CHOLESTEROL/HDL RATIO: 2.5
CHOLESTEROL: 119 MG/DL
CO2: 26 MMOL/L (ref 20–31)
CREAT SERPL-MCNC: 1.64 MG/DL (ref 0.7–1.2)
DIFFERENTIAL TYPE: YES
EOSINOPHILS RELATIVE PERCENT: 2 % (ref 0–5)
GFR AFRICAN AMERICAN: 50 ML/MIN
GFR NON-AFRICAN AMERICAN: 42 ML/MIN
GFR SERPL CREATININE-BSD FRML MDRD: ABNORMAL ML/MIN/{1.73_M2}
GFR SERPL CREATININE-BSD FRML MDRD: ABNORMAL ML/MIN/{1.73_M2}
GLUCOSE BLD-MCNC: 137 MG/DL (ref 70–99)
HCT VFR BLD CALC: 40.4 % (ref 41–53)
HDLC SERPL-MCNC: 48 MG/DL
HEMOGLOBIN: 13.5 G/DL (ref 13.5–17.5)
IMMATURE GRANULOCYTES: ABNORMAL %
LDL CHOLESTEROL: 51 MG/DL (ref 0–130)
LV EF: 38 %
LVEF MODALITY: NORMAL
LYMPHOCYTES # BLD: 12 % (ref 13–44)
MAGNESIUM: 2.2 MG/DL (ref 1.6–2.6)
MCH RBC QN AUTO: 33.6 PG (ref 26–34)
MCHC RBC AUTO-ENTMCNC: 33.3 G/DL (ref 31–37)
MCV RBC AUTO: 100.8 FL (ref 80–100)
MONOCYTES # BLD: 8 % (ref 5–9)
NRBC AUTOMATED: ABNORMAL PER 100 WBC
PATIENT FASTING?: YES
PDW BLD-RTO: 15.3 % (ref 12.1–15.2)
PLATELET # BLD: 197 K/UL (ref 140–450)
PLATELET ESTIMATE: ABNORMAL
PMV BLD AUTO: ABNORMAL FL (ref 6–12)
POTASSIUM SERPL-SCNC: 4.4 MMOL/L (ref 3.7–5.3)
RBC # BLD: 4.01 M/UL (ref 4.5–5.9)
RBC # BLD: ABNORMAL 10*6/UL
SEG NEUTROPHILS: 78 % (ref 39–75)
SEGMENTED NEUTROPHILS ABSOLUTE COUNT: 6.1 K/UL (ref 2.1–6.5)
SODIUM BLD-SCNC: 138 MMOL/L (ref 135–144)
THYROXINE, FREE: 1.73 NG/DL (ref 0.93–1.7)
TOTAL PROTEIN: 7.1 G/DL (ref 6.4–8.3)
TRIGL SERPL-MCNC: 100 MG/DL
TSH SERPL DL<=0.05 MIU/L-ACNC: 8.19 MIU/L (ref 0.3–5)
VITAMIN D 25-HYDROXY: 41 NG/ML (ref 30–100)
VLDLC SERPL CALC-MCNC: NORMAL MG/DL (ref 1–30)
WBC # BLD: 7.7 K/UL (ref 3.5–11)
WBC # BLD: ABNORMAL 10*3/UL

## 2021-08-10 PROCEDURE — 80061 LIPID PANEL: CPT

## 2021-08-10 PROCEDURE — 93306 TTE W/DOPPLER COMPLETE: CPT

## 2021-08-10 PROCEDURE — 85025 COMPLETE CBC W/AUTO DIFF WBC: CPT

## 2021-08-10 PROCEDURE — 93005 ELECTROCARDIOGRAM TRACING: CPT

## 2021-08-10 PROCEDURE — 36415 COLL VENOUS BLD VENIPUNCTURE: CPT

## 2021-08-10 PROCEDURE — 82306 VITAMIN D 25 HYDROXY: CPT

## 2021-08-10 PROCEDURE — 83036 HEMOGLOBIN GLYCOSYLATED A1C: CPT

## 2021-08-10 PROCEDURE — 80053 COMPREHEN METABOLIC PANEL: CPT

## 2021-08-10 PROCEDURE — 83735 ASSAY OF MAGNESIUM: CPT

## 2021-08-10 PROCEDURE — 84439 ASSAY OF FREE THYROXINE: CPT

## 2021-08-10 PROCEDURE — 84443 ASSAY THYROID STIM HORMONE: CPT

## 2021-08-10 PROCEDURE — 99214 OFFICE O/P EST MOD 30 MIN: CPT | Performed by: INTERNAL MEDICINE

## 2021-08-10 RX ORDER — SILDENAFIL 100 MG/1
100 TABLET, FILM COATED ORAL PRN
Qty: 30 TABLET | Refills: 5 | Status: SHIPPED | OUTPATIENT
Start: 2021-08-10 | End: 2022-07-25

## 2021-08-10 NOTE — PROGRESS NOTES
Follow up with pacer check  Trying to exercise and walking outside  So much other stuff to do  No hospitalizations No SOB  Seeing family  Aug 23  Mows with rider rough spots bumpy  Wonders if it effects heart rate  Daughters doing well  One Going to [place] sitting while she is gone  Some edema foot about the same in right  Lungs sound good  Sometimes feels a little lightheaded  Labs were good  Echo looks good  See in 6 mo with labs/ekg  Pacer check

## 2021-08-11 LAB
EKG ATRIAL RATE: 78 BPM
EKG P AXIS: 17 DEGREES
EKG P-R INTERVAL: 166 MS
EKG Q-T INTERVAL: 502 MS
EKG QRS DURATION: 162 MS
EKG QTC CALCULATION (BAZETT): 553 MS
EKG R AXIS: 135 DEGREES
EKG T AXIS: -10 DEGREES
EKG VENTRICULAR RATE: 73 BPM
ESTIMATED AVERAGE GLUCOSE: 134 MG/DL
HBA1C MFR BLD: 6.3 % (ref 4–6)

## 2021-08-11 PROCEDURE — 93010 ELECTROCARDIOGRAM REPORT: CPT | Performed by: INTERNAL MEDICINE

## 2021-08-27 RX ORDER — FUROSEMIDE 20 MG/1
TABLET ORAL
Qty: 180 TABLET | Refills: 3 | Status: SHIPPED | OUTPATIENT
Start: 2021-08-27 | End: 2022-06-06

## 2021-11-02 RX ORDER — SPIRONOLACTONE 25 MG/1
TABLET ORAL
Qty: 180 TABLET | Refills: 1 | Status: SHIPPED | OUTPATIENT
Start: 2021-11-02 | End: 2022-02-10

## 2021-11-12 RX ORDER — AMIODARONE HYDROCHLORIDE 200 MG/1
TABLET ORAL
Qty: 90 TABLET | Refills: 3 | Status: SHIPPED | OUTPATIENT
Start: 2021-11-12

## 2022-01-10 RX ORDER — SACUBITRIL AND VALSARTAN 24; 26 MG/1; MG/1
TABLET, FILM COATED ORAL
Qty: 180 TABLET | Refills: 3 | Status: SHIPPED | OUTPATIENT
Start: 2022-01-10 | End: 2022-06-06

## 2022-02-02 ENCOUNTER — CONSULT (OUTPATIENT)
Dept: URBAN - METROPOLITAN AREA CLINIC 38 | Facility: CLINIC | Age: 72
Setting detail: DERMATOLOGY
End: 2022-02-02

## 2022-02-02 ENCOUNTER — RX ONLY (RX ONLY)
Age: 72
End: 2022-02-02

## 2022-02-02 DIAGNOSIS — L85.3 XEROSIS CUTIS: ICD-10-CM

## 2022-02-02 DIAGNOSIS — L40.0 PSORIASIS VULGARIS: ICD-10-CM

## 2022-02-02 PROBLEM — L82.1 OTHER SEBORRHEIC KERATOSIS: Status: RESOLVED | Noted: 2022-02-02

## 2022-02-02 PROBLEM — L71.8 OTHER ROSACEA: Status: RESOLVED | Noted: 2022-02-02

## 2022-02-02 PROBLEM — L71.1 RHINOPHYMA: Status: RESOLVED | Noted: 2022-02-02

## 2022-02-02 PROBLEM — L20.9 ATOPIC DERMATITIS, UNSPECIFIED: Status: RESOLVED | Noted: 2022-02-02

## 2022-02-02 PROCEDURE — 99203 OFFICE O/P NEW LOW 30 MIN: CPT

## 2022-02-02 RX ORDER — TRIAMCINOLONE ACETONIDE 1 MG/G
A SMALL AMOUNT CREAM TOPICAL TWICE A DAY
Qty: 80 | Refills: 5
Start: 2022-02-02

## 2022-02-07 DIAGNOSIS — R06.02 SHORTNESS OF BREATH: ICD-10-CM

## 2022-02-07 DIAGNOSIS — I25.10 CORONARY ARTERY DISEASE INVOLVING NATIVE CORONARY ARTERY OF NATIVE HEART WITHOUT ANGINA PECTORIS: Primary | ICD-10-CM

## 2022-02-07 DIAGNOSIS — G47.30 SLEEP APNEA, UNSPECIFIED TYPE: ICD-10-CM

## 2022-02-08 ENCOUNTER — OFFICE VISIT (OUTPATIENT)
Dept: CARDIOLOGY CLINIC | Age: 72
End: 2022-02-08
Payer: COMMERCIAL

## 2022-02-08 ENCOUNTER — HOSPITAL ENCOUNTER (OUTPATIENT)
Age: 72
Discharge: HOME | End: 2022-02-10
Payer: COMMERCIAL

## 2022-02-08 ENCOUNTER — HOSPITAL ENCOUNTER (OUTPATIENT)
Age: 72
Discharge: HOME | End: 2022-02-08
Payer: COMMERCIAL

## 2022-02-08 ENCOUNTER — HOSPITAL ENCOUNTER (OUTPATIENT)
Dept: GENERAL RADIOLOGY | Age: 72
Discharge: HOME | End: 2022-02-10
Payer: COMMERCIAL

## 2022-02-08 VITALS
SYSTOLIC BLOOD PRESSURE: 160 MMHG | DIASTOLIC BLOOD PRESSURE: 60 MMHG | OXYGEN SATURATION: 97 % | WEIGHT: 271 LBS | BODY MASS INDEX: 41.21 KG/M2 | HEART RATE: 67 BPM

## 2022-02-08 DIAGNOSIS — I25.10 CORONARY ARTERY DISEASE INVOLVING NATIVE CORONARY ARTERY OF NATIVE HEART WITHOUT ANGINA PECTORIS: ICD-10-CM

## 2022-02-08 DIAGNOSIS — E55.9 VITAMIN D DEFICIENCY: ICD-10-CM

## 2022-02-08 DIAGNOSIS — I10 ESSENTIAL HYPERTENSION: ICD-10-CM

## 2022-02-08 DIAGNOSIS — E78.5 HYPERLIPIDEMIA, UNSPECIFIED HYPERLIPIDEMIA TYPE: ICD-10-CM

## 2022-02-08 DIAGNOSIS — E11.9 TYPE 2 DIABETES MELLITUS WITHOUT COMPLICATION, WITHOUT LONG-TERM CURRENT USE OF INSULIN (HCC): ICD-10-CM

## 2022-02-08 DIAGNOSIS — R06.02 SHORTNESS OF BREATH: ICD-10-CM

## 2022-02-08 DIAGNOSIS — I34.0 NONRHEUMATIC MITRAL VALVE REGURGITATION: ICD-10-CM

## 2022-02-08 DIAGNOSIS — Z95.810 ICD (IMPLANTABLE CARDIOVERTER-DEFIBRILLATOR) IN PLACE: ICD-10-CM

## 2022-02-08 DIAGNOSIS — I25.10 CORONARY ARTERY DISEASE INVOLVING NATIVE CORONARY ARTERY OF NATIVE HEART WITHOUT ANGINA PECTORIS: Primary | ICD-10-CM

## 2022-02-08 DIAGNOSIS — G47.30 SLEEP APNEA, UNSPECIFIED TYPE: ICD-10-CM

## 2022-02-08 LAB
ABSOLUTE EOS #: 0.1 K/UL (ref 0–0.4)
ABSOLUTE IMMATURE GRANULOCYTE: ABNORMAL K/UL (ref 0–0.3)
ABSOLUTE LYMPH #: 0.9 K/UL (ref 1–4.8)
ABSOLUTE MONO #: 0.6 K/UL (ref 0–1)
ALBUMIN SERPL-MCNC: 4.3 G/DL (ref 3.5–5.2)
ALBUMIN/GLOBULIN RATIO: ABNORMAL (ref 1–2.5)
ALP BLD-CCNC: 69 U/L (ref 40–129)
ALT SERPL-CCNC: 24 U/L (ref 5–41)
ANION GAP SERPL CALCULATED.3IONS-SCNC: 11 MMOL/L (ref 9–17)
AST SERPL-CCNC: 21 U/L
BASOPHILS # BLD: 1 % (ref 0–2)
BASOPHILS ABSOLUTE: 0 K/UL (ref 0–0.2)
BILIRUB SERPL-MCNC: 1.03 MG/DL (ref 0.3–1.2)
BUN BLDV-MCNC: 31 MG/DL (ref 8–23)
BUN/CREAT BLD: 17 (ref 9–20)
CALCIUM SERPL-MCNC: 9.2 MG/DL (ref 8.6–10.4)
CHLORIDE BLD-SCNC: 104 MMOL/L (ref 98–107)
CHOLESTEROL/HDL RATIO: 2.5
CHOLESTEROL: 112 MG/DL
CO2: 24 MMOL/L (ref 20–31)
CREAT SERPL-MCNC: 1.84 MG/DL (ref 0.7–1.2)
DIFFERENTIAL TYPE: YES
EOSINOPHILS RELATIVE PERCENT: 1 % (ref 0–5)
GFR AFRICAN AMERICAN: 44 ML/MIN
GFR NON-AFRICAN AMERICAN: 36 ML/MIN
GFR SERPL CREATININE-BSD FRML MDRD: ABNORMAL ML/MIN/{1.73_M2}
GFR SERPL CREATININE-BSD FRML MDRD: ABNORMAL ML/MIN/{1.73_M2}
GLUCOSE BLD-MCNC: 159 MG/DL (ref 70–99)
HCT VFR BLD CALC: 37.2 % (ref 41–53)
HDLC SERPL-MCNC: 44 MG/DL
HEMOGLOBIN: 12.2 G/DL (ref 13.5–17.5)
IMMATURE GRANULOCYTES: ABNORMAL %
LDL CHOLESTEROL: 52 MG/DL (ref 0–130)
LYMPHOCYTES # BLD: 12 % (ref 13–44)
MAGNESIUM: 2.2 MG/DL (ref 1.6–2.6)
MCH RBC QN AUTO: 32.7 PG (ref 26–34)
MCHC RBC AUTO-ENTMCNC: 32.7 G/DL (ref 31–37)
MCV RBC AUTO: 99.8 FL (ref 80–100)
MONOCYTES # BLD: 8 % (ref 5–9)
NRBC AUTOMATED: ABNORMAL PER 100 WBC
PATIENT FASTING?: YES
PDW BLD-RTO: 15.4 % (ref 12.1–15.2)
PLATELET # BLD: 199 K/UL (ref 140–450)
PLATELET ESTIMATE: ABNORMAL
PMV BLD AUTO: ABNORMAL FL (ref 6–12)
POTASSIUM SERPL-SCNC: 4.2 MMOL/L (ref 3.7–5.3)
RBC # BLD: 3.73 M/UL (ref 4.5–5.9)
RBC # BLD: ABNORMAL 10*6/UL
SEG NEUTROPHILS: 78 % (ref 39–75)
SEGMENTED NEUTROPHILS ABSOLUTE COUNT: 6 K/UL (ref 2.1–6.5)
SODIUM BLD-SCNC: 139 MMOL/L (ref 135–144)
THYROXINE, FREE: 1.43 NG/DL (ref 0.93–1.7)
TOTAL PROTEIN: 6.8 G/DL (ref 6.4–8.3)
TRIGL SERPL-MCNC: 82 MG/DL
TSH SERPL DL<=0.05 MIU/L-ACNC: 12.46 MIU/L (ref 0.3–5)
VITAMIN D 25-HYDROXY: 45.5 NG/ML (ref 30–100)
VLDLC SERPL CALC-MCNC: NORMAL MG/DL (ref 1–30)
WBC # BLD: 7.7 K/UL (ref 3.5–11)
WBC # BLD: ABNORMAL 10*3/UL

## 2022-02-08 PROCEDURE — 84439 ASSAY OF FREE THYROXINE: CPT

## 2022-02-08 PROCEDURE — 80053 COMPREHEN METABOLIC PANEL: CPT

## 2022-02-08 PROCEDURE — 85025 COMPLETE CBC W/AUTO DIFF WBC: CPT

## 2022-02-08 PROCEDURE — 93005 ELECTROCARDIOGRAM TRACING: CPT

## 2022-02-08 PROCEDURE — 83735 ASSAY OF MAGNESIUM: CPT

## 2022-02-08 PROCEDURE — 83036 HEMOGLOBIN GLYCOSYLATED A1C: CPT

## 2022-02-08 PROCEDURE — 80061 LIPID PANEL: CPT

## 2022-02-08 PROCEDURE — 93289 INTERROG DEVICE EVAL HEART: CPT | Performed by: INTERNAL MEDICINE

## 2022-02-08 PROCEDURE — 99214 OFFICE O/P EST MOD 30 MIN: CPT | Performed by: INTERNAL MEDICINE

## 2022-02-08 PROCEDURE — 71046 X-RAY EXAM CHEST 2 VIEWS: CPT

## 2022-02-08 PROCEDURE — 82306 VITAMIN D 25 HYDROXY: CPT

## 2022-02-08 PROCEDURE — 84443 ASSAY THYROID STIM HORMONE: CPT

## 2022-02-08 PROCEDURE — 36415 COLL VENOUS BLD VENIPUNCTURE: CPT

## 2022-02-08 NOTE — PROGRESS NOTES
Ov Dr. Duane Hutchison for 6 month follow up  ICD checked today by [employer]  C/o should pain put on steroids  Had pt finished yesterday   Feels \"ok\" \"pretty good\"  occ lightheadedness with sudden move   Or bending over   No hospitalizations/procedures/er visits  Last appt with Maria D Hernandez was 3-4 weeks ago   Had a Positive A - not B or Covid  Bedside echo done      No changes    Follow up in 6 months

## 2022-02-09 LAB
EKG ATRIAL RATE: 84 BPM
EKG P AXIS: 37 DEGREES
EKG P-R INTERVAL: 198 MS
EKG Q-T INTERVAL: 452 MS
EKG QRS DURATION: 134 MS
EKG QTC CALCULATION (BAZETT): 534 MS
EKG R AXIS: 138 DEGREES
EKG T AXIS: -26 DEGREES
EKG VENTRICULAR RATE: 84 BPM
ESTIMATED AVERAGE GLUCOSE: 151 MG/DL
HBA1C MFR BLD: 6.9 % (ref 4–6)

## 2022-02-09 PROCEDURE — 93010 ELECTROCARDIOGRAM REPORT: CPT | Performed by: INTERNAL MEDICINE

## 2022-02-10 ENCOUNTER — TELEPHONE (OUTPATIENT)
Dept: CARDIOLOGY CLINIC | Age: 72
End: 2022-02-10

## 2022-02-10 RX ORDER — FINERENONE 10 MG/1
TABLET, FILM COATED ORAL
COMMUNITY
End: 2022-03-03 | Stop reason: ALTCHOICE

## 2022-02-10 RX ORDER — FINERENONE 10 MG/1
10 TABLET, FILM COATED ORAL DAILY
Qty: 30 TABLET | Refills: 11 | Status: SHIPPED | OUTPATIENT
Start: 2022-02-10 | End: 2022-03-03 | Stop reason: ALTCHOICE

## 2022-02-10 NOTE — PROGRESS NOTES
Martin Wooten M.D. [address] [place] [address]  [phone]        2022        Nabor Carranza, [facility], [address] [facility]      RE:   Jann Bachelor  :  1950      Dear Dr. Dewey Denise:    CHIEF COMPLAINT:  1. Severe cardiomyopathy with an EF initially of 25% to 30%. 2.  Status post biventricular ICD on 2020.  3.  Ablation by Dr. Christiano Renee for ventricular tachycardia on 2020.  4. Increase in his ejection fraction by bedside echocardiogram to the 35% range. HISTORY OF PRESENT ILLNESS:  I had the pleasure of seeing Mr. Jasmine in the office on 2022. He is a pleasant 69-year-old  with a very complex history. He had ST-elevation myocardial infarction on 10/19/1999, given tPA. A catheterization showed severe triple-vessel disease and he had open heart surgery by Dr. Sarah Shah on 1999, with a radial bypass graft sequentially to the PDA and posterior ventricular branch of the right coronary artery, a free right internal mammary artery bypass graft to the diagonal and anterior branch of the circumflex and a LIMA to the LAD. A catheterization on 2007 showed patent bypass grafts with occluded posterolateral branch of the right coronary artery, EF of 30% to 35%, and a single-lead ICD was placed on 2007, by Dr. Tuyet Díaz. On 2014, he had nonsustained ventricular tachycardia and defibrillation and was recommended that he go on long-term Cordarone by Dr. Cirilo Oreilly. He had an Evera Medtronic single-lead pacemaker upgrade by Dr. Cirilo Oreilly at that time. I did a catheterization on 2015. In 2019, his QRS prolonged with left bundle-branch block and I felt he should be considered for biventricular ICD. He saw Dr. Danielle Long, who did not feel that he met criteria and therefore he had ablation by Dr. Christiano Renee on 2019.     He then followed up again with Dr. Jennifer Nguyễn who upgraded his ICD on 01/13/2020, placing a left ventricular lead and an atrial lead. An echocardiogram on 09/09/2020, showed an EF of 25% to 30%. He felt better after his biventricular ICD but had worsening shortness of breath thereafter. On 09/14/2020, we changed the medication, decreasing amiodarone to 200 mg 5 days a week, started Entresto 24/26 b.i.d. and increased his Aldactone and Lasix to 20 mg b.i.d. He did have a ventricular storm at [facility] on 09/29/2020, and his amiodarone was increased to 200 mg daily. He did see Dr. Jennifer Nguyễn again on 10/09/2020, he was having episodes of ventricular tachycardia. A VT ablation was done on 11/25/2020. He has done well since his ablation. He has had no further ventricular storms. In addition, when we checked his ICD today, he had no runs of any tachycardia except one run at 130 beats per minute that lasted 11 seconds and was asymptomatic. He looks good as I see him today. His right shoulder was hurting, he was put on steroids and finished 7 days yesterday. He has occasional lightheadedness when he bends down and stands up. He has had no hospitalizations or procedures. No syncope or near syncope. CARDIAC RISK FACTORS:  Known CAD:  Positive. Hypertension:  Positive. Hyperlipidemia:  Positive. Peripheral Vascular Disease:  Negative. Smoking:  Negative. Diabetes:  Positive. Other Family Members:  Positive. MEDICATIONS AT THIS TIME:  He is on albuterol p.r.n., Cordarone 200 mg daily, vitamin C 1000 mg daily, aspirin 81 mg daily, Lipitor 80 mg daily, Coreg 6.25 mg b.i.d., Eliquis 5 mg b.i.d., Entresto 24/26 b.i.d., Advair 250/50 q. 12 hours, Lasix 20 mg b.i.d., Amaryl 1 mg b.i.d., levothyroxine 75 mcg daily, Glucophage 500 mg b.i.d., Viagra 100 mg p.r.n., Aldactone 25 mg b.i.d., and vitamin D 1000 units daily. PAST MEDICAL AND SURGICAL HISTORY:  1. Type 2 diabetes, well controlled.   2.  Sleep apnea, on a CPAP mask. 3.  Hypertension, well controlled. 4.  Hyperlipidemia. 5.  Chronic mild renal insufficiency, creatinine at 1.7 to 1.8 at baseline. 6.  Hypothyroidism, on replacement, with his TSH still elevated at 12.  8.  Ablation for ventricular tachycardia on 2019, upgrade to biventricular ICD on 2020.  9.  VT ablation again on 2020, with no further episodes. 10.  Atrial fibrillation, controlled with amiodarone, with him currently on Eliquis. FAMILY HISTORY:  Mother  of Alzheimer's at 68. Father  of MI.    SOCIAL HISTORY:  He is 66years old, [facility] in [place]. . Two daughters, live in [place]. He has a 32-foot boat in [place]. One daughter is a nurse at [employer]. His wife had severe back pain and passed away in [place] in 2021 after prolonged hospitalization. He does not smoke or drink alcohol. REVIEW OF SYSTEMS:  Cardiac as above. Other systems reviewed including constitutional, eyes, ears, nose and throat, cardiovascular, respiratory, GI, , musculoskeletal, integumentary, neurologic, endocrine, hematologic and allergic/immunologic are negative except for what is described above. No weight loss or weight gain. No change in bowel habits, no blood in stools. No fevers, sweats or chills. PHYSICAL EXAMINATION:  VITAL SIGNS:  His blood pressure was 115/67 with a heart rate of 67 and regular. Respiratory rate 18. O2 saturation 97%. Weight 271 pounds. GENERAL:  He is a pleasant 78-year-old gentleman. Denied pain. He was oriented to person, place and time. Answered questions appropriately. SKIN:  No unusual skin changes. HEENT:  The pupils are equally round and intact. Mucous membranes were dry. NECK:  No JVD. Good carotid pulses. No carotid bruits. No lymphadenopathy or thyromegaly. CARDIOVASCULAR EXAM:  S1 and S2 were normal.  No S3 or S4. Soft systolic blowing type murmur. No diastolic murmur.   PMI was normal.  No lift, thrust, or pericardial friction rub. LUNGS:  Clear to auscultation and percussion. ABDOMEN:  Soft and nontender. Good bowel sounds. EXTREMITIES:  Good femoral pulses. Good pedal pulses. Mild pedal edema. Skin was warm and dry. No calf tenderness. Nail beds pink. Good cap refill. PULSES:  Bilateral symmetrical radial, brachial and carotid pulses. No carotid bruits. Good femoral and pedal pulses. NEUROLOGIC EXAM:  Within normal limits. PSYCHIATRIC EXAM:  Within normal limits. LABORATORY DATA:  Sodium was 139, potassium 4.2, BUN 31, creatinine 1.84, GFR 36. Calcium was 9.2. ALT was 24, AST was 21. His TSH was 12.46. White count 7.7, hemoglobin 12.2, with a platelet count of 256,897. ICD was interrogated and is working properly. Bedside echocardiogram appeared to show an EF in the 30% to 35% range. IMPRESSION:  1. Severe LV dysfunction with EF in the 25% to 30% on an echocardiogram on 09/09/2020.  2.  Abnormal stress test on 10/02/2020, that showed inferior wall scar, with no reversible ischemia. 3.  History of atrial flutter with ablation by Dr. Starla Almaguer on 07/29/2019. 4.  MI on 10/19/1999, with triple-vessel disease. 5.  Open heart surgery by Dr. Josephine Weber on 12/25/1999, with a radial bypass graft to the PDA and posterior ventricular branch of the right coronary artery, a free right internal mammary artery bypass graft to the diagonal and two branches of the circumflex, and a LIMA to the LAD. 6.  Catheterization on 03/04/2015, showed an occluded radial bypass graft to the right coronary artery as well as an occluded right coronary artery. 7.  ICD implanted on 11/26/2007, by Dr. María Mcconnell. 8.  Generator change on 12/03/2014, by Dr. Viky Guillen. 9.  Biventricular ICD upgraded to atrial lead and ventricular lead placed by Dr. Starla Almaguer with a Medtronic Quad biventricular ICD on 01/13/2020. 10.  Chronic renal insufficiency about at baseline.   11.  On thyroid replacement, managed by  Jaime, with his TSH elevated at 12.  12.  Non-insulin-dependent diabetes. 13.  Ventricular storm after amiodarone was decreased to 5 days per week, now increased to 7 days per week with no further episodes of ventricular tachycardia. 14.  Ablation for VT at [facility] by Dr. Peggy Fiore on 11/25/2020, with no further episodes. 15.  On Entresto and Coreg per guidelines. 16.  Episode of chest pain on 03/18/2020, in [place], which was musculoskeletal.    PLAN:  1. We will discuss switching from Aldactone to [place] because of his diabetes, renal insufficiency and CHF. 2.  Echocardiogram in August.  3.  Follow up in 6 months with an echocardiogram at that time. DISCUSSION:  Mr. Juan Corbin overall is doing well. He has had no chest pain or chest discomfort. No unusual shortness of breath. His blood pressure is slightly elevated. He has had no syncope or near syncope. He does have renal insufficiency, diabetes and history of CHF. I think he would be a good candidate for [place] starting at 10 mg daily, increasing to 20 mg daily, watching his potassium carefully. He has had no episodes of ventricular tachycardia. We will repeat an echo in August to follow his LV function. At this time, he seems to be doing well. He has had no arrhythmias and no episodes of congestive heart failure. Thank you very much for allowing me the privilege of seeing Mr. Ciarra Alvarez. If you have any questions on my thoughts, please do not hesitate to contact me.     Sincerely,        Estefania Jim[place]    D: 02/08/2022 14:16:47     T: 02/09/2022 6:37:17     [email or web address]  Job#: [id number]   Doc#: [id number]

## 2022-02-10 NOTE — TELEPHONE ENCOUNTER
Gadiel Smiley called to state that he had received a call from Dr. Abida Bucio recommending that he start [facility] Jannie. Gadiel Smiley stated that he has some questions and concerns after he researched [place] [employer] [place]. He is concerned about the side effects he read about that it could raise the Potassium level and also cause irregular heart rate. He stated that since he has AFIB he is concerned about this. He stated that he does not ever second guess Dr. Abida Bucio, but he just wants to make sure the benefits would out weigh the risk. .. Marquis Osmani Keen Please call Gadiel Smiley. ..

## 2022-02-10 NOTE — TELEPHONE ENCOUNTER
Pt notified per Dr Lawrence López think of it as a new and improved Aldactone.   Pt will stop aldactone and switch to Sandra   Will get bmp in 2 weeks

## 2022-02-21 DIAGNOSIS — E11.9 TYPE 2 DIABETES MELLITUS WITHOUT COMPLICATION, WITHOUT LONG-TERM CURRENT USE OF INSULIN (HCC): ICD-10-CM

## 2022-02-21 DIAGNOSIS — I34.0 MITRAL VALVE INSUFFICIENCY, UNSPECIFIED ETIOLOGY: ICD-10-CM

## 2022-02-21 DIAGNOSIS — I25.10 CORONARY ARTERY DISEASE INVOLVING NATIVE CORONARY ARTERY OF NATIVE HEART WITHOUT ANGINA PECTORIS: Primary | ICD-10-CM

## 2022-02-28 ENCOUNTER — TELEPHONE (OUTPATIENT)
Dept: CARDIOLOGY CLINIC | Age: 72
End: 2022-02-28

## 2022-02-28 DIAGNOSIS — I25.10 CORONARY ARTERY DISEASE INVOLVING NATIVE CORONARY ARTERY OF NATIVE HEART WITHOUT ANGINA PECTORIS: ICD-10-CM

## 2022-02-28 DIAGNOSIS — I50.9 CHRONIC CONGESTIVE HEART FAILURE, UNSPECIFIED HEART FAILURE TYPE (HCC): ICD-10-CM

## 2022-02-28 DIAGNOSIS — I25.10 CORONARY ARTERY DISEASE INVOLVING NATIVE CORONARY ARTERY OF NATIVE HEART WITHOUT ANGINA PECTORIS: Primary | ICD-10-CM

## 2022-02-28 NOTE — TELEPHONE ENCOUNTER
Gadiel Smiley called to state that his weight is up about 3 lbs and he has really been watching his diet and salt. He stated that he started [place]annie a week ago today and he just isn't sure if he needs an additional water pill or more of the Lasix. He is taking Lasix twice a day as prescribed. He stated that he was more SOB toward the end of last week and even putting his pants on seemed to tire him. He is leaving for vacation and hopefully to golf this coming Sunday and would love to feel better for his trip. Please call Gadiel Smiley. ..

## 2022-03-03 ENCOUNTER — TELEPHONE (OUTPATIENT)
Dept: CARDIOLOGY CLINIC | Age: 72
End: 2022-03-03

## 2022-03-03 DIAGNOSIS — I50.9 CHRONIC CONGESTIVE HEART FAILURE, UNSPECIFIED HEART FAILURE TYPE (HCC): ICD-10-CM

## 2022-03-03 RX ORDER — SPIRONOLACTONE 25 MG/1
25 TABLET ORAL 2 TIMES DAILY
COMMUNITY
End: 2022-04-25

## 2022-03-03 NOTE — TELEPHONE ENCOUNTER
DR Idalia Mason reviewed bmp and called pt   He is to stop Sandra  Restart aldactone 25 bid  And decrease lasix to 20 mg twice a day.

## 2022-03-15 ENCOUNTER — TELEPHONE (OUTPATIENT)
Dept: CARDIOLOGY CLINIC | Age: 72
End: 2022-03-15

## 2022-03-15 DIAGNOSIS — I25.10 CORONARY ARTERY DISEASE INVOLVING NATIVE CORONARY ARTERY OF NATIVE HEART WITHOUT ANGINA PECTORIS: Primary | ICD-10-CM

## 2022-03-15 NOTE — TELEPHONE ENCOUNTER
Justyn Christian called to ask if he needed any more labs done after changing medication. He stated that he just got back from vacation and he is doing pretty good. Still some SOB. He stated that some days while he was on vacation he did take an extra Lasix in the AM and he feels heavier, he isn't sure if it is from the food/drinks or from retaining fluid. Please call Justyn Christian to let him know if he needs any labs and he will do them. He will be doing labs for Dr. Ebonie Campbell on Friday, but he isn't sure what Dr. Ebonie Campbell has ordered. I called, but they couldn't tell me. .      Please call Justyn Christian

## 2022-03-17 ENCOUNTER — TELEPHONE (OUTPATIENT)
Dept: CARDIOLOGY CLINIC | Age: 72
End: 2022-03-17

## 2022-03-17 DIAGNOSIS — I10 ESSENTIAL HYPERTENSION: Primary | ICD-10-CM

## 2022-03-17 NOTE — TELEPHONE ENCOUNTER
Pt call with bmp results  Pt states wt is still increasing   PER Dr. Nishi Galdamez and continue   All other meds (water pills) like you are now (aldacone and lasix bid)  Will check bmp in 1 week   Order sent to [facility]

## 2022-03-21 ENCOUNTER — TELEPHONE (OUTPATIENT)
Dept: CARDIOLOGY CLINIC | Age: 72
End: 2022-03-21

## 2022-03-21 NOTE — TELEPHONE ENCOUNTER
Carline Betancourt called to state that he had an appointment with Dr. Munira Dominguez this morning and he discontinued his Metformin. He stated that Dr. Munira Dominguez stated that it was having an adverse effect on his kidneys. He stated that he just isn't feeling the best.  He stated that he is fatigued and SOB just walking into the grocery store. He will have his labs done Thursday morning and if Dr. Lawrence López would want to see him he can come over in the afternoon. Please call Carline Betancourt with results of his labs and if appt needed. ..

## 2022-03-24 ENCOUNTER — HOSPITAL ENCOUNTER (OUTPATIENT)
Age: 72
Discharge: HOME | End: 2022-03-24
Payer: COMMERCIAL

## 2022-03-24 ENCOUNTER — OFFICE VISIT (OUTPATIENT)
Dept: CARDIOLOGY CLINIC | Age: 72
End: 2022-03-24
Payer: COMMERCIAL

## 2022-03-24 ENCOUNTER — HOSPITAL ENCOUNTER (OUTPATIENT)
Dept: GENERAL RADIOLOGY | Age: 72
Discharge: HOME | End: 2022-03-26
Payer: COMMERCIAL

## 2022-03-24 ENCOUNTER — HOSPITAL ENCOUNTER (OUTPATIENT)
Age: 72
Discharge: HOME | End: 2022-03-26
Payer: COMMERCIAL

## 2022-03-24 VITALS
HEART RATE: 67 BPM | DIASTOLIC BLOOD PRESSURE: 60 MMHG | OXYGEN SATURATION: 95 % | SYSTOLIC BLOOD PRESSURE: 110 MMHG | BODY MASS INDEX: 40.78 KG/M2 | WEIGHT: 268.2 LBS

## 2022-03-24 DIAGNOSIS — R06.02 SOB (SHORTNESS OF BREATH): Primary | ICD-10-CM

## 2022-03-24 DIAGNOSIS — I10 PRIMARY HYPERTENSION: ICD-10-CM

## 2022-03-24 DIAGNOSIS — I50.9 CONGESTIVE HEART FAILURE, UNSPECIFIED HF CHRONICITY, UNSPECIFIED HEART FAILURE TYPE (HCC): ICD-10-CM

## 2022-03-24 DIAGNOSIS — R60.1 GENERALIZED EDEMA: ICD-10-CM

## 2022-03-24 DIAGNOSIS — R53.83 FATIGUE, UNSPECIFIED TYPE: ICD-10-CM

## 2022-03-24 DIAGNOSIS — M54.89 OTHER BACK PAIN, UNSPECIFIED CHRONICITY: ICD-10-CM

## 2022-03-24 DIAGNOSIS — I34.0 NONRHEUMATIC MITRAL VALVE REGURGITATION: ICD-10-CM

## 2022-03-24 DIAGNOSIS — I34.0 NONRHEUMATIC MITRAL VALVE REGURGITATION: Primary | ICD-10-CM

## 2022-03-24 LAB
-: ABNORMAL
ABSOLUTE EOS #: 0 K/UL (ref 0–0.4)
ABSOLUTE LYMPH #: 0.6 K/UL (ref 1–4.8)
ABSOLUTE MONO #: 0.8 K/UL (ref 0–1)
ALBUMIN SERPL-MCNC: 3.9 G/DL (ref 3.5–5.2)
ALP BLD-CCNC: 68 U/L (ref 40–129)
ALT SERPL-CCNC: 19 U/L (ref 5–41)
ANION GAP SERPL CALCULATED.3IONS-SCNC: 11 MMOL/L (ref 9–17)
AST SERPL-CCNC: 19 U/L
BACTERIA: ABNORMAL
BASOPHILS # BLD: 0 % (ref 0–2)
BASOPHILS ABSOLUTE: 0 K/UL (ref 0–0.2)
BILIRUB SERPL-MCNC: 1.33 MG/DL (ref 0.3–1.2)
BILIRUBIN URINE: NEGATIVE
BUN BLDV-MCNC: 37 MG/DL (ref 8–23)
BUN/CREAT BLD: 15 (ref 9–20)
CALCIUM SERPL-MCNC: 9.3 MG/DL (ref 8.6–10.4)
CHLORIDE BLD-SCNC: 100 MMOL/L (ref 98–107)
CO2: 26 MMOL/L (ref 20–31)
COLOR: YELLOW
COMMENT UA: ABNORMAL
CREAT SERPL-MCNC: 2.45 MG/DL (ref 0.7–1.2)
DIFFERENTIAL TYPE: YES
EOSINOPHILS RELATIVE PERCENT: 0 % (ref 0–5)
EPITHELIAL CELLS UA: ABNORMAL /HPF
GFR AFRICAN AMERICAN: 32 ML/MIN
GFR NON-AFRICAN AMERICAN: 26 ML/MIN
GFR SERPL CREATININE-BSD FRML MDRD: ABNORMAL ML/MIN/{1.73_M2}
GLUCOSE BLD-MCNC: 235 MG/DL (ref 70–99)
GLUCOSE URINE: ABNORMAL
HCT VFR BLD CALC: 36 % (ref 41–53)
HEMOGLOBIN: 11.6 G/DL (ref 13.5–17.5)
KETONES, URINE: NEGATIVE
LEUKOCYTE ESTERASE, URINE: ABNORMAL
LYMPHOCYTES # BLD: 8 % (ref 13–44)
MCH RBC QN AUTO: 32.1 PG (ref 26–34)
MCHC RBC AUTO-ENTMCNC: 32.3 G/DL (ref 31–37)
MCV RBC AUTO: 99.5 FL (ref 80–100)
MONOCYTES # BLD: 10 % (ref 5–9)
NITRITE, URINE: NEGATIVE
PDW BLD-RTO: 16.1 % (ref 12.1–15.2)
PH UA: 6 (ref 5–8)
PLATELET # BLD: 177 K/UL (ref 140–450)
POTASSIUM SERPL-SCNC: 4.2 MMOL/L (ref 3.7–5.3)
PROTEIN UA: ABNORMAL
RBC # BLD: 3.61 M/UL (ref 4.5–5.9)
RBC UA: ABNORMAL /HPF (ref 0–2)
SEG NEUTROPHILS: 82 % (ref 39–75)
SEGMENTED NEUTROPHILS ABSOLUTE COUNT: 7.1 K/UL (ref 2.1–6.5)
SODIUM BLD-SCNC: 137 MMOL/L (ref 135–144)
SPECIFIC GRAVITY UA: 1.02 (ref 1–1.03)
THYROXINE, FREE: 1.78 NG/DL (ref 0.93–1.7)
TOTAL PROTEIN: 6.5 G/DL (ref 6.4–8.3)
TSH SERPL DL<=0.05 MIU/L-ACNC: 12.78 MIU/L (ref 0.3–5)
TURBIDITY: ABNORMAL
URINE HGB: ABNORMAL
UROBILINOGEN, URINE: NORMAL
WBC # BLD: 8.7 K/UL (ref 3.5–11)
WBC UA: ABNORMAL /HPF

## 2022-03-24 PROCEDURE — 71046 X-RAY EXAM CHEST 2 VIEWS: CPT

## 2022-03-24 PROCEDURE — 84443 ASSAY THYROID STIM HORMONE: CPT

## 2022-03-24 PROCEDURE — 99214 OFFICE O/P EST MOD 30 MIN: CPT | Performed by: INTERNAL MEDICINE

## 2022-03-24 PROCEDURE — 85025 COMPLETE CBC W/AUTO DIFF WBC: CPT

## 2022-03-24 PROCEDURE — 36415 COLL VENOUS BLD VENIPUNCTURE: CPT

## 2022-03-24 PROCEDURE — 80053 COMPREHEN METABOLIC PANEL: CPT

## 2022-03-24 PROCEDURE — 81001 URINALYSIS AUTO W/SCOPE: CPT

## 2022-03-24 PROCEDURE — 84439 ASSAY OF FREE THYROXINE: CPT

## 2022-03-24 PROCEDURE — 87086 URINE CULTURE/COLONY COUNT: CPT

## 2022-03-24 RX ORDER — ALBUTEROL SULFATE 90 UG/1
AEROSOL, METERED RESPIRATORY (INHALATION)
COMMUNITY
Start: 2022-03-21

## 2022-03-24 NOTE — PROGRESS NOTES
Ov Dr. Noemi Gómez for follow up   Here today with dtg Jovany Ford   Who is a nurse at the [address]- started one month ago   Continues to get worse   Wt has been stable 268.2 x 4 days   Has been off entresto x one week   Feel worse since entresto was stopped   C/o weakness  Prior to vacation-\"starting to feel it\"   No chest pain occ tightness  Sleeping in bed up having to sit up often   No edema   ICD checked today by Christi Richter  Bedside echo done    Will proceed to [address] [place], [place] [address]Th [facility]   dtg will be taking him to [facility]

## 2022-03-25 LAB
CULTURE: NORMAL
SPECIMEN DESCRIPTION: NORMAL

## 2022-03-29 NOTE — PROGRESS NOTES
Corie Eisenmenger, M.D. [address] [place] [address] [address]  [phone]        2022        Jaida Obrien MD  [address] [place] [address]    RE:   Charo Jasmine  :  1950    Dear Dr. Waddell Legacy:    CHIEF COMPLAINT:  1. Increasing shortness of breath. 2.  Worsening renal insufficiency. 3.  CHF. HISTORY OF PRESENT ILLNESS:  I had the pleasure of seeing Mr. Jasmine in our office on 2022. I had seen him last on 2022. At that time, he was doing well. He was walking without difficulty and really had no complaints as I saw him. However, since that time, he has developed increasing shortness of breath. I had changed him from Aldactone to [place] [employer] [place] 10 mg b.i.d. for the renal and CHF protective effects. However, he worsened with increasing weight and shortness of breath, and therefore, I stopped his [place] and placed him back on Aldactone at 25 mg b.i.d., which was his previous dose. He continued to have worsening shortness of breath. He was in [place] and we had increased his Lasix. However, when he returned, he continued to have worsening shortness of breath and having difficulty with any walking and with sleeping. He called our office today and we had him come into our office. His daughter, Vicki Cobos, who is a nurse at the [facility] [employer] [employer] [facility] [employer] in radiation therapy, was with him. He denies any chest pain or chest discomfort. We had stopped his Entresto one week ago because of worsening renal issues. He has had no syncope or near syncope. We checked his ICD and there have been no defibrillations.     MEDICATIONS:  His medications today are albuterol p.r.n., amiodarone 200 mg daily, aspirin 81 mg daily, Lipitor 80 mg daily, Coreg 6.25 mg b.i.d., Eliquis 5 mg b.i.d., Entresto 24/26 b.i.d. (we have held it for one week), Advair 250/50 b.i.d., Lasix 20 mg b.i.d., Amaryl 1 mg b.i.d., Synthroid 88 mcg daily, Aldactone 25 mg b.i.d., zinc 220 mg daily. PHYSICAL EXAMINATION:  VITAL SIGNS:  His blood pressure was 110/60 with a heart rate of 67 and regular. Respirations 18. O2 sat 95%. Weight 268 pounds. GENERAL:  He is a pleasant 76-year-old gentleman. Denied pain. He was oriented to person, place and time. Answered questions appropriately. SKIN:  No unusual skin changes. HEENT:  The pupils are equally round and intact. Mucous membranes were dry. NECK:  No JVD. Good carotid pulses. No carotid bruits. No lymphadenopathy or thyromegaly. CARDIOVASCULAR EXAM:  S1 and S2 were normal.  No S3 or S4. Soft systolic blowing type murmur. No diastolic murmur. PMI was normal.  No lift, thrust, or pericardial friction rub. LUNGS:  Clear to auscultation and percussion. ABDOMEN:  Soft and nontender. Good bowel sounds. EXTREMITIES:  Good femoral pulses. Good pedal pulses. He had 3+ pedal edema. Skin was warm and dry. No calf tenderness. Nail beds pink. Good cap refill. PULSES:  Bilateral symmetrical radial, brachial and carotid pulses. No carotid bruits. Good femoral and pedal pulses. NEUROLOGIC EXAM:  Within normal limits. PSYCHIATRIC EXAM:  Within normal limits. LABORATORY DATA:  From today, his sodium was 137, potassium 4.2, BUN 37, creatinine 2.45 with a GFR of 26. His ALT was 19, AST was 19. TSH was 12.78 with a free thyroxine of 1.78. White count 8.7, hemoglobin 11.6 with a platelet count 039,905. His chest x-ray showed mild pulmonary edema. IMPRESSION:  1.  CHF. 2.  Renal insufficiency with creatinine up to 2.45, with his baseline creatinine at 1.6 to 1.8.  3.  Off Entresto for the last week because of his worsening renal insufficiency. 4.  Severe LV dysfunction, EF of 25% to 30%. 5.  Biventricular ICD upgraded by Dr. Nishant Davis on 2020.  6.  On thyroid replacement. 7.  Non-insulin-dependent diabetes.     PLAN:  We will have him drive to [facility] where he will be admitted for CHF. DISCUSSION:  Mr. Hanna Harper has had progressive shortness of breath and renal insufficiency for the last month, worse in the last several weeks. His renal function has deteriorated. His creatinine is up to 2.45, with his baseline creatinine of 1.6 to 1.8. He does seem to be diuresed as I am sure this is adding to his renal insufficiency. However, he will need to be in a tertiary center where nephrology is available. His daughter, Abhijit Fraser, works at [employer], and both she and her sister live in [place]. Therefore, I think it is reasonable for her to take him to the emergency room in [place]. He is not in extremis and I think the process can be shortened with her driving directly to the emergency room rather than admitting here and attempting to transfer down to [facility]. He has had no chest pain or chest discomfort. He is breathing without difficulty. He would only get short of breath with activity. His O2 saturation is good at 93%. I will see him in followup after he is discharged from [facility]. I did explain to him that I do not believe that this means significant worsening overall condition. I think his fluid status needs to be optimized, which will help his renal insufficiency. I do not believe that his ejection fraction has changed and I fully expect him to return back to his baseline activity. Thank you very much for allowing me the privilege of seeing Mr. Jasmine. If you have any questions on my thoughts, please do not hesitate to contact me.     Sincerely,        Suzi Gaston    D: 03/27/2022 18:26:40     T: 03/27/2022 18:30:53     [email or web address]  Job#: [id number]   Doc#: [id number]

## 2022-04-25 RX ORDER — SPIRONOLACTONE 25 MG/1
TABLET ORAL
Qty: 180 TABLET | Refills: 1 | Status: SHIPPED | OUTPATIENT
Start: 2022-04-25 | End: 2022-06-06

## 2022-05-17 RX ORDER — CARVEDILOL 6.25 MG/1
TABLET ORAL
Qty: 180 TABLET | Refills: 3 | Status: SHIPPED | OUTPATIENT
Start: 2022-05-17 | End: 2022-06-06 | Stop reason: ALTCHOICE

## 2022-05-23 ENCOUNTER — TELEPHONE (OUTPATIENT)
Dept: CARDIOLOGY CLINIC | Age: 72
End: 2022-05-23

## 2022-05-23 NOTE — TELEPHONE ENCOUNTER
[facility] left a message stating that he needs a 2 week follow up to go over his medications  with a device check. He has an appointment Aug 9th. Do we need to see in 2 weeks. ?     Please advise

## 2022-06-06 ENCOUNTER — HOSPITAL ENCOUNTER (OUTPATIENT)
Age: 72
Discharge: HOME | End: 2022-06-06
Payer: COMMERCIAL

## 2022-06-06 ENCOUNTER — OFFICE VISIT (OUTPATIENT)
Dept: CARDIOLOGY CLINIC | Age: 72
End: 2022-06-06
Payer: COMMERCIAL

## 2022-06-06 VITALS
OXYGEN SATURATION: 96 % | HEART RATE: 78 BPM | BODY MASS INDEX: 37.86 KG/M2 | SYSTOLIC BLOOD PRESSURE: 102 MMHG | DIASTOLIC BLOOD PRESSURE: 60 MMHG | WEIGHT: 249 LBS

## 2022-06-06 DIAGNOSIS — I50.9 CONGESTIVE HEART FAILURE, UNSPECIFIED HF CHRONICITY, UNSPECIFIED HEART FAILURE TYPE (HCC): ICD-10-CM

## 2022-06-06 DIAGNOSIS — I50.9 CONGESTIVE HEART FAILURE, UNSPECIFIED HF CHRONICITY, UNSPECIFIED HEART FAILURE TYPE (HCC): Primary | ICD-10-CM

## 2022-06-06 LAB
ANION GAP SERPL CALCULATED.3IONS-SCNC: 13 MMOL/L (ref 9–17)
BUN BLDV-MCNC: 52 MG/DL (ref 8–23)
BUN/CREAT BLD: 21 (ref 9–20)
CALCIUM SERPL-MCNC: 9.4 MG/DL (ref 8.6–10.4)
CHLORIDE BLD-SCNC: 101 MMOL/L (ref 98–107)
CO2: 24 MMOL/L (ref 20–31)
CREAT SERPL-MCNC: 2.49 MG/DL (ref 0.7–1.2)
GFR AFRICAN AMERICAN: 31 ML/MIN
GFR NON-AFRICAN AMERICAN: 26 ML/MIN
GFR SERPL CREATININE-BSD FRML MDRD: ABNORMAL ML/MIN/{1.73_M2}
GLUCOSE BLD-MCNC: 90 MG/DL (ref 70–99)
POTASSIUM SERPL-SCNC: 4.4 MMOL/L (ref 3.7–5.3)
SODIUM BLD-SCNC: 138 MMOL/L (ref 135–144)

## 2022-06-06 PROCEDURE — 36415 COLL VENOUS BLD VENIPUNCTURE: CPT

## 2022-06-06 PROCEDURE — 1123F ACP DISCUSS/DSCN MKR DOCD: CPT | Performed by: INTERNAL MEDICINE

## 2022-06-06 PROCEDURE — 80048 BASIC METABOLIC PNL TOTAL CA: CPT

## 2022-06-06 PROCEDURE — 99214 OFFICE O/P EST MOD 30 MIN: CPT | Performed by: INTERNAL MEDICINE

## 2022-06-06 RX ORDER — FUROSEMIDE 20 MG/1
20 TABLET ORAL DAILY
COMMUNITY
End: 2022-08-29

## 2022-06-06 RX ORDER — METOPROLOL SUCCINATE 50 MG/1
50 TABLET, EXTENDED RELEASE ORAL DAILY
COMMUNITY
End: 2022-06-10 | Stop reason: SDUPTHER

## 2022-06-06 RX ORDER — SACUBITRIL AND VALSARTAN 24; 26 MG/1; MG/1
0.5 TABLET, FILM COATED ORAL 2 TIMES DAILY
COMMUNITY
End: 2022-10-17

## 2022-06-06 RX ORDER — SPIRONOLACTONE 25 MG/1
25 TABLET ORAL DAILY
COMMUNITY
End: 2022-09-12

## 2022-06-06 NOTE — PROGRESS NOTES
Ov DR Terrence Peterson follow up   Post discharge from [facility] 5/20  Was in for chf and a fib/flutter   Was in and out of rhythm   Had planned cardioversion  Went back into rhythm on   Own and stayed in rhythm   Pt admits prior to going to ED  Forgotten to take meds for 2 days  And that morning he went in. No chest pain or sob. Feels like he has been in rhythm   Since. No ankle edema   No dizziness. ICD checked. Did start cardiac rehab  In another week will do   On his own due to schedule. Bedside echo done. Back to work. Seen Dr Celio Maldonado on May 23. Will do bmp today. Will keep August appt.

## 2022-06-13 RX ORDER — METOPROLOL SUCCINATE 50 MG/1
50 TABLET, EXTENDED RELEASE ORAL DAILY
Qty: 30 TABLET | Refills: 0 | Status: SHIPPED | OUTPATIENT
Start: 2022-06-13 | End: 2022-10-17 | Stop reason: ALTCHOICE

## 2022-06-13 RX ORDER — METOPROLOL SUCCINATE 50 MG/1
50 TABLET, EXTENDED RELEASE ORAL DAILY
Qty: 90 TABLET | Refills: 3 | Status: SHIPPED | OUTPATIENT
Start: 2022-06-13 | End: 2022-06-13 | Stop reason: SDUPTHER

## 2022-06-15 NOTE — PROGRESS NOTES
Marco Antonio Nassar M.D. [address] [place] [address]  [phone]        2022        [facility] [address] [facility]    RE:   Vik Mata  :  1950    Dear Dr. Larissa Moran:    CHIEF COMPLAINT:  1. Severe cardiomyopathy with an EF of 25% to 30%. 2.  Biventricular ICD placed on 2020.  3.  Ablation by Dr. Alexa Escamilla for ventricular tachycardia on 2020.  4.  Hospitalization for CHF at [facility] on 2022, and also on 2022, for atrial tachycardia and atrial fibrillation. HISTORY OF PRESENT ILLNESS:  I had the pleasure of seeing Mr. Jasmine in the office on 2022. He is a pleasant 65-year-old gentleman who had ST-elevation myocardial infarction on 10/19/1999, and was given tPA and a catheterization showed severe triple-vessel disease and he had open heart surgery by Dr. Mac Love on 1999, with a radial bypass graft sequentially to the PDA and posterior ventricular branch of the right coronary artery and free right internal mammary artery bypass to the diagonal and two branches of the circumflex and LIMA to the LAD. A catheterization on 2007, showed patent bypass grafts with an occluded posterolateral branch of the right coronary artery, his EF was down to 30% to 35%, and a single-lead ICD was placed on 2007, by Dr. Criss Prince. On 2014, he had a ventricular tachycardia and was in defibrillation and was recommended to go on long-term Cordarone by Dr. Chiqui Mcelroy. He had an Evera Medtronic single-lead pacemaker placed by Dr. Chiqui Mcelroy. I did a catheterization on 2015, which was unchanged. In 2019, he had QRS prolongation of left bundle-branch block and I felt the patient should be considered for a biventricular ICD. Dr. Kanika Hernandez did not feel that he met criteria and had ablation by Dr. Alexa Escamilla on 2019.     He then followed up with Dr. Olga Hatfield who upgraded his ICD to a bi-chamber ICD on 01/13/2020, with biventricular ICD with a left ventricular lead and an atrial lead. On 09/14/2020, we decreased amiodarone to 200 mg 5 days a week and started him on Entresto, increased Aldactone and Lasix. He did have a ventricular storm at [facility] on 09/29/2020, and his amiodarone was increased to 200 mg daily. He had two episodes of ventricular tachycardia on 10/09/2020, and VT ablation was done on 11/25/2020. I did try him on [place] and unfortunately he deteriorated. I saw him on 03/24, and at that time, he had more shortness of breath with increasing weight. I stopped his [place] and placed him back on Aldactone 25 mg b.i.d. However, he continued to have shortness of breath and I sent him to [address] [place] [address] where he was hospitalized on 03/24/2022. He did have a right heart catheterization at that time. He had an elevated wedge pressure of 38. His EF was still in the 25 to 30 range. He was placed back on his home medications on discharge. He did well. He did see Dr. Olga Hatfield on 04/08/2022, and he had no episodes of ventricular tachycardia. His amiodarone was continued. He was hospitalized on 05/17/2022, at [address] [place] [address] for CHF. He had not taken his medications however for two days. He was in slow atrial flutter when he arrived with a heart rate in the 130s. He was asymptomatic. EP had planned to proceed with a NEVIN and cardioversion but he spontaneously converted to an AV paced rhythm. He was continued on his Eliquis, which he has been on for several years. When I see him today, he is doing well. He did go to cardiac rehab but feels that he would do more if he was exercising on his own. He goes to the [facility] three days a week. He denies any chest pain or chest discomfort. He has stayed in rhythm since his discharge. He has had no PND, orthopnea or pedal edema.   He looks about the best I have seen look now for approximately a year. CARDIAC RISK FACTORS:  Known CAD:  Positive. Hypertension:  Positive. Hyperlipidemia:  Positive. Peripheral Vascular Disease:  Negative. Smoking:  Negative. Diabetes:  Positive. Other Family Members:  Positive. MEDICATIONS AT HOME:  He is currently on Cordarone 200 mg daily, aspirin 81 mg daily, Lipitor 80 mg daily, CoQ10 daily, Eliquis 5 mg b.i.d., Advair 250/50 one puff b.i.d., Lasix 20 mg daily, Amaryl 1 mg b.i.d., levothyroxine 88 mcg daily, Toprol-XL 50 mg daily, Entresto  half a tablet b.i.d., Viagra p.r.n., Aldactone 25 mg daily, Spiriva 2 puffs daily, vitamin D 1000 mg units daily, zinc 20 mg daily. PAST MEDICAL AND SURGICAL HISTORY:  1. Type 2 diabetes, well controlled. 2.  Sleep apnea, on a CPAP. 3.  Hypertension, well controlled. 4.  Hyperlipidemia. 5.  Mild renal insufficiency, which is stable. 6.  Euthyroid, on replacement. 7.  Ablation for ventricular tachycardia on 2019, with upgrade to biventricular ICD on 2020.  8.  Ablation again on 2020.  9.  Atrial fib/flutter, controlled with amiodarone, on Eliquis. FAMILY HISTORY:  Mother  of Alzheimer's at 68. Father  of MI.    SOCIAL HISTORY:  He is 66years old. He is the formerly [facility] in [place]. Two daughters, live in [address] [place] [address]. He has a 32-foot boat in [place]. Daughter is a nurse at [employer]. His wife passed away at [facility] in 2021. He does not smoke or drink alcohol. He is exercising two to three days a week. REVIEW OF SYSTEMS:  Cardiac as above. Other systems reviewed including constitutional, eyes, ears, nose and throat, cardiovascular, respiratory, GI, , musculoskeletal, integumentary, neurologic, endocrine, hematologic and allergic/immunologic are negative except for what is described above. No weight loss or weight gain. No change in bowel habits. No blood in stool. No fevers, sweats or chills.     PHYSICAL EXAMINATION:  VITAL SIGNS:  His blood pressure was 102/60 with a heart rate of 78 and regular. Respiratory rate 18. O2 sat 96%. Weight 249 pounds. GENERAL:  He is a pleasant 69-year-old gentleman. Denied pain. He was oriented to person, place and time. Answered questions appropriately. SKIN:  No unusual skin changes. HEENT:  The pupils are equally round and intact. Mucous membranes were dry. NECK:  No JVD. Good carotid pulses. No carotid bruits. No lymphadenopathy or thyromegaly. CARDIOVASCULAR EXAM:  S1 and S2 were normal.  No S3 or S4. Soft systolic blowing type murmur. No diastolic murmur. PMI was normal.  No lift, thrust, or pericardial friction rub. LUNGS:  Clear to auscultation and percussion. ABDOMEN:  Soft and nontender. Good bowel sounds. EXTREMITIES:  Good femoral pulses. Good pedal pulses. Mild pedal edema. Skin was warm and dry. No calf tenderness. Nail beds pink. Good cap refill. PULSES:  Bilateral symmetrical radial, brachial and carotid pulses. No carotid bruits. Good femoral and pedal pulses. NEUROLOGIC EXAM:  Within normal limits. PSYCHIATRIC EXAM:  Within normal limits. LABORATORY DATA:  Sodium 138, potassium 4.4, BUN 52, creatinine about at baseline at 2.49. IMPRESSION:  1. Severe LV dysfunction, ejection fraction of 25% to 30%, which has been stable. 2.  Atrial flutter with ablation by Dr. Greyson Butcher on 07/29/2019, with him having another episode of slow atrial flutter on 05/17/2022, from which he converted spontaneously to sinus rhythm, which he has remained. 3.  Currently on amiodarone daily along with Eliquis 5 mg b.i.d.  4.  Open heart surgery by Dr. Yodit Avila, on 12/25/1999, with a radial bypass graft to the PDA and posterior branch of the right coronary artery, free right internal mammary artery bypass graft to the diagonal and two branches of the circumflex and LIMA to the LAD.   5.  Catheterization on 03/04/2015, that showed occluded radial bypass graft to the right coronary artery as well as an occluded right coronary artery. 6.  ICD implanted on 11/26/2007, by Dr. Becky Durant. 7.  Generator change on 12/03/2014, by Dr. Luna Fernando. 8.  Biventricular ICD upgraded with an atrial lead and ventricular lead placed by Dr. Caitlin Nelson using a Medtronic Quad biventricular ICD on 01/13/2020.  9.  Chronic renal insufficiency about at baseline with his creatinine at 2.49.  10.  Ablation for VT at [facility] by Dr. Caitlin Nelson on 11/25/2020. 11. On Entresto and _____12:12 per guidelines. 12.  Hospitalization for CHF on 05/17/2022, after he had not taken his medicine for two days. PLAN:  1. No change in medications. 2.  Continue same medications. 3.  We will keep his August appointment. DISCUSSION:  Mr. Sydnee Gallardo overall is doing well. He has had no chest pain or chest discomfort. He is about at baseline as far as his fluid balance and he is very careful about watching salt intake and water intake. He has been in cardiac rehab but feels that he would do more activity going on his own to the [facility]. This is perfectly fine with me. We will continue to watch his fluid status. He does have severe LV dysfunction, EF in the 25% to 30% range but with his biventricular ICD continues to do well. Thank you very much for allowing me the privilege of seeing Mr. Jasmine. If you have any questions on my thoughts, please do not hesitate to contact me.      Sincerely,        Tracie Elliott    D: 06/12/2022 21:19:00     T: 06/12/2022 21:27:52     [email or web address]  Job#: [id number]   Doc#: [id number]

## 2022-07-05 RX ORDER — APIXABAN 5 MG/1
TABLET, FILM COATED ORAL
Qty: 180 TABLET | Refills: 3 | Status: SHIPPED | OUTPATIENT
Start: 2022-07-05

## 2022-07-25 RX ORDER — ATORVASTATIN CALCIUM 80 MG/1
TABLET, FILM COATED ORAL
Qty: 90 TABLET | Refills: 3 | Status: SHIPPED | OUTPATIENT
Start: 2022-07-25

## 2022-07-25 RX ORDER — SILDENAFIL 100 MG/1
TABLET, FILM COATED ORAL
Qty: 18 TABLET | Refills: 3 | Status: SHIPPED | OUTPATIENT
Start: 2022-07-25 | End: 2022-08-09 | Stop reason: ALTCHOICE

## 2022-08-09 ENCOUNTER — OFFICE VISIT (OUTPATIENT)
Dept: CARDIOLOGY CLINIC | Age: 72
End: 2022-08-09
Payer: COMMERCIAL

## 2022-08-09 ENCOUNTER — HOSPITAL ENCOUNTER (OUTPATIENT)
Age: 72
End: 2022-08-09
Payer: COMMERCIAL

## 2022-08-09 ENCOUNTER — HOSPITAL ENCOUNTER (OUTPATIENT)
Age: 72
Discharge: HOME | End: 2022-08-09
Payer: COMMERCIAL

## 2022-08-09 VITALS
SYSTOLIC BLOOD PRESSURE: 102 MMHG | OXYGEN SATURATION: 94 % | WEIGHT: 259 LBS | DIASTOLIC BLOOD PRESSURE: 64 MMHG | BODY MASS INDEX: 39.38 KG/M2 | HEART RATE: 82 BPM

## 2022-08-09 DIAGNOSIS — E55.9 VITAMIN D DEFICIENCY: ICD-10-CM

## 2022-08-09 DIAGNOSIS — Z95.810 ICD (IMPLANTABLE CARDIOVERTER-DEFIBRILLATOR) IN PLACE: ICD-10-CM

## 2022-08-09 DIAGNOSIS — E11.9 TYPE 2 DIABETES MELLITUS WITHOUT COMPLICATION, WITHOUT LONG-TERM CURRENT USE OF INSULIN (HCC): ICD-10-CM

## 2022-08-09 DIAGNOSIS — I10 PRIMARY HYPERTENSION: ICD-10-CM

## 2022-08-09 DIAGNOSIS — I25.10 CORONARY ARTERY DISEASE INVOLVING NATIVE CORONARY ARTERY OF NATIVE HEART WITHOUT ANGINA PECTORIS: ICD-10-CM

## 2022-08-09 DIAGNOSIS — E78.5 HYPERLIPIDEMIA, UNSPECIFIED HYPERLIPIDEMIA TYPE: ICD-10-CM

## 2022-08-09 DIAGNOSIS — E78.00 PURE HYPERCHOLESTEROLEMIA: ICD-10-CM

## 2022-08-09 DIAGNOSIS — I10 ESSENTIAL HYPERTENSION: ICD-10-CM

## 2022-08-09 DIAGNOSIS — I50.9 CONGESTIVE HEART FAILURE, UNSPECIFIED HF CHRONICITY, UNSPECIFIED HEART FAILURE TYPE (HCC): Primary | ICD-10-CM

## 2022-08-09 PROCEDURE — 1123F ACP DISCUSS/DSCN MKR DOCD: CPT | Performed by: INTERNAL MEDICINE

## 2022-08-09 PROCEDURE — 99214 OFFICE O/P EST MOD 30 MIN: CPT | Performed by: INTERNAL MEDICINE

## 2022-08-09 PROCEDURE — 93005 ELECTROCARDIOGRAM TRACING: CPT

## 2022-08-09 PROCEDURE — 93290 INTERROG DEV EVAL ICPMS IP: CPT | Performed by: INTERNAL MEDICINE

## 2022-08-09 PROCEDURE — 93284 PRGRMG EVAL IMPLANTABLE DFB: CPT | Performed by: INTERNAL MEDICINE

## 2022-08-09 RX ORDER — TADALAFIL 5 MG/1
5 TABLET ORAL DAILY
Qty: 30 TABLET | Refills: 11 | Status: SHIPPED | OUTPATIENT
Start: 2022-08-09

## 2022-08-09 RX ORDER — TADALAFIL 20 MG/1
20 TABLET ORAL DAILY PRN
Qty: 30 TABLET | Refills: 3 | Status: SHIPPED | OUTPATIENT
Start: 2022-08-09

## 2022-08-09 NOTE — PROGRESS NOTES
Ov Dr Guajardo Im 6 mth follow up. ICD checked per medtronic  No chest pain   Sob when bending over. Or walk a long distance. No dizziness. Bp lower at home also. Occ ankle edema. By end of the day   Lt worse than rt. No hospitalizations or   Procedures since seen. Started back to the gym  Back to work. Has until end of year 2023. Bedside echo done. Will change viagra   To cialis 5mg daily  And 20 mg prn   Has a girlfriend name  Blanca Taylor. Will see in 6 mths.

## 2022-08-10 LAB
EKG ATRIAL RATE: 66 BPM
EKG P-R INTERVAL: 166 MS
EKG Q-T INTERVAL: 468 MS
EKG QRS DURATION: 176 MS
EKG QTC CALCULATION (BAZETT): 553 MS
EKG R AXIS: 89 DEGREES
EKG T AXIS: -35 DEGREES
EKG VENTRICULAR RATE: 84 BPM

## 2022-08-10 PROCEDURE — 93010 ELECTROCARDIOGRAM REPORT: CPT | Performed by: INTERNAL MEDICINE

## 2022-08-14 NOTE — PROGRESS NOTES
Adolfo Fernandez M.D. [address] [place] [address] Gavin Arreola   [phone]        2022        Dominic Bergeron MD  [address] [place] [address], [address] [place] [address]    RE:   Apurva Anderson Mitali  :  1950    Dear Dr. Guera Enriquez:    CHIEF COMPLAINT:  1. Severe cardiomyopathy, EF in the 25% to 30% range. 2.  Moderate-to-severe to severe pulmonary hypertension with a PA pressure estimated at 55 to 60 by last echocardiogram in [facility] on 2022.  3.  Atrial fibrillation with spontaneous conversion to sinus rhythm. HISTORY OF PRESENT ILLNESS:  I had the pleasure of seeing Fuentes Ca in our office on 2022. My full H and P is outlined on 2022. He was hospitalized for CHF at [facility] on 2022, and also on 2022, for atrial tachycardia and atrial fibrillation. As I see him today, he is doing well. He has had no further episodes of atrial fibrillation or hospitalizations. He has been going to cardiac rehab, although he is not exercising as much as he had been previously. His Coreg was changed to metoprolol XL 50 mg daily and he is tolerating this without difficulty. His blood pressure remains in the 95 to 100 range. He does have more fatigue than previously, although he is still able to do his usual activities. He is also trying to use a treadmill or walk outside several times per week. He plans on increasing his exercise capacity. He has had no chest pain or chest discomfort. He does have pedal edema, which is better in the morning and by evening it is fairly large on both lower extremities. His weight, however, stays about the same. He takes his blood pressure daily and it is generally in the 95 to 100 range. He has been tolerating Toprol-XL 50 mg without difficulty. He is doing more activities such as going to [facility] to be on his boat.     He also has a  by the name of Maxx Torres, who he is spending more time with at this time. He was questioning whether he would be a candidate for Cialis on a 5 mg daily dosage. MEDICATIONS:  He is on albuterol p.r.n., Cordarone 200 mg daily, vitamin C 1000 mg daily, aspirin 81 mg daily, Lipitor 80 mg daily, CoQ10 daily, Eliquis 5 mg b.i.d., Advair 200/50 q. 12 hours, Lasix 20 mg daily, Amaryl 1 mg b.i.d., levothyroxine 88 mcg daily, Toprol-XL 50 mg daily, Entresto 24/26 half a tablet b.i.d., Aldactone 25 mg daily, Spiriva 2 puffs daily, vitamin D 1000 units daily. PHYSICAL EXAMINATION:  VITAL SIGNS:  His blood pressure today was 95/60 with a heart rate of 82 and regular. Respiratory rate 18. O2 sat 94%. Weight 259 pounds. GENERAL:  He is a pleasant 69-year-old gentleman. Denied pain. He was oriented to person, place and time. Answered questions appropriately. SKIN:  No unusual skin changes. HEENT:  The pupils are equally round and intact. Mucous membranes were dry. NECK:  No JVD. Good carotid pulses. No carotid bruits. No lymphadenopathy or thyromegaly. CARDIOVASCULAR EXAM:  S1 and S2 were normal.  No S3 or S4. Soft systolic blowing type murmur. No diastolic murmur. PMI was normal.  No lift, thrust, or pericardial friction rub. LUNGS:  Clear to auscultation and percussion. ABDOMEN:  Soft and nontender. Good bowel sounds. EXTREMITIES:  Good femoral pulses. Good pedal pulses. He had 2+ edema. Skin was warm and dry. No calf tenderness. Nail beds pink. Good cap refill. PULSES:  Bilateral symmetrical radial, brachial and carotid pulses. No carotid bruits. Good femoral and pedal pulses. NEUROLOGIC EXAM:  Within normal limits. PSYCHIATRIC EXAM:  Within normal limits. LABORATORY DATA:  From [facility] on 08/04, his white count was 5.67, hemoglobin 11.0. His sodium 141, potassium 4.3, creatinine 2.34, which is about his baseline. Calcium was 8.4. AST was 21, ALT was 31.   His cholesterol was 103 with an HDL of 39, LDL 45. TSH was normal at 1.2. EKG showed dual paced rhythm. His echocardiogram showed an EF in the 30% to 35% range where it had been previously. IMPRESSION:  1. Moderate-to-severe to severe LV dysfunction, EF in the 30% to 35% range. 2.  Atrial flutter with ablation by Dr. Jazmyne Medeiros on 07/29/2019, with another episode of slow atrial flutter on 05/17/2022, from which he converted spontaneously to sinus rhythm. 3.  Episode of atrial fibrillation on 05/17, from which he converted spontaneously to sinus rhythm, which he has remained. 4.  Open heart surgery by Dr. Vincent Lou on 12/25/1999, with a radial bypass graft to the PDA and posterior branch of the right coronary artery, free right internal mammary artery bypass graft to the diagonal and two branches of the circumflex and LIMA to the LAD. 5.  Catheterization on 03/04/2015, showed occluded radial bypass graft to the right coronary artery as well as occluded right coronary artery. 6.  ICD implanted on 11/26/2007, by Dr. Rony Salas. 7.  Generator change on 12/03/2014, by Dr. Betty Cano. 8.  Biventricular ICD upgraded with atrial lead and ventricular lead placed by Dr. Jazmyne Medeiros with a Medtronic Quad biventricular ICD on 01/13/2020.  9.  Chronic renal insufficiency about at baseline with a creatinine currently at 2.34.  10.  Ablation for VT at [facility] by Dr. Jazmyne Medeiros on 11/25/2020. 11.  Erectile dysfunction. PLAN:  1. Start Cialis 5 mg daily. 2.  We will give him Cialis 20 mg to take a half a tablet as needed. 3.  We will follow up in 6 months. 4.  If his weight goes up, he would use his Lasix and Aldactone twice a day for one to two days and go back to once a day dosing. DISCUSSION:  Mr. Shabana Corona overall is doing well. He does have some fatigue, which may be due to his rather low blood pressure. However, he is tolerating it overall with his pressure in the 95 to 100 range.   Certainly, this is a good blood pressure for his cardiomyopathy. His lab work looks good and stable. He is watching his weight very carefully. I have placed him on Cialis 5 mg to use daily. I also gave him a prescription of Cialis 20 mg to take a half a tablet as needed. I look forward to seeing him in 6 months. He has been given my cell number to call anytime that he would begin developing issues. Thank you very much for allowing me the privilege of seeing Mr. Jasmine. If you have any questions on my thoughts, please do not hesitate to contact me.      Sincerely,        Chaka Uribe    D: 08/09/2022 14:20:19     T: 08/09/2022 14:26:13     [email or web address]  Job#: [id number]   Doc#: [id number]

## 2022-08-29 RX ORDER — FUROSEMIDE 20 MG/1
TABLET ORAL
Qty: 180 TABLET | Refills: 3 | Status: SHIPPED | OUTPATIENT
Start: 2022-08-29 | End: 2022-10-17 | Stop reason: ALTCHOICE

## 2022-09-12 ENCOUNTER — TELEPHONE (OUTPATIENT)
Dept: CARDIOLOGY CLINIC | Age: 72
End: 2022-09-12

## 2022-09-12 DIAGNOSIS — R60.1 GENERALIZED EDEMA: ICD-10-CM

## 2022-09-12 DIAGNOSIS — I50.9 CONGESTIVE HEART FAILURE, UNSPECIFIED HF CHRONICITY, UNSPECIFIED HEART FAILURE TYPE (HCC): Primary | ICD-10-CM

## 2022-09-12 DIAGNOSIS — I25.10 CORONARY ARTERY DISEASE INVOLVING NATIVE CORONARY ARTERY OF NATIVE HEART WITHOUT ANGINA PECTORIS: ICD-10-CM

## 2022-09-12 RX ORDER — SPIRONOLACTONE 25 MG/1
25 TABLET ORAL 2 TIMES DAILY
Qty: 60 TABLET | Refills: 11
Start: 2022-09-12 | End: 2022-09-14 | Stop reason: DRUGHIGH

## 2022-09-12 NOTE — TELEPHONE ENCOUNTER
Pt notified labs good-continue same med regimen  As over weekend recheck kidneys in 2 weeks  Pt states in 1.5 week will be having labs/seeing   Dr in [facility] [employer] that took care of the Giovanni" while in hospital

## 2022-09-14 ENCOUNTER — TELEPHONE (OUTPATIENT)
Dept: CARDIOLOGY CLINIC | Age: 72
End: 2022-09-14

## 2022-09-14 RX ORDER — SPIRONOLACTONE 25 MG/1
25 TABLET ORAL DAILY
Qty: 60 TABLET | Refills: 11 | Status: SHIPPED | OUTPATIENT
Start: 2022-09-14 | End: 2022-10-17 | Stop reason: ALTCHOICE

## 2022-09-14 NOTE — TELEPHONE ENCOUNTER
Pt is taking aldactone bid  Per Dr Hector Anguiano decrease  back to daily  Has upcoming appt at [facility] will call with update.

## 2022-09-14 NOTE — TELEPHONE ENCOUNTER
Андрей Mtz called to state that he noticed his Potassium was on the verge of being high and he wants to know if Dr. Elsy Pemberton would want to order a medication or should he do anything. .      Please call Андрей Mtz

## 2022-09-28 ENCOUNTER — TELEPHONE (OUTPATIENT)
Dept: CARDIOLOGY CLINIC | Age: 72
End: 2022-09-28

## 2022-09-28 NOTE — TELEPHONE ENCOUNTER
Pt notified   [id number] Jennifer Sanderson taking farxiga   Get bmp in one week instead  Of 2 .   Pt not sx with bp's therefore, no change  In meds

## 2022-09-28 NOTE — TELEPHONE ENCOUNTER
Cris Huizar called to ask if Dr. Vale Pete is okay with him taking Fargixa 10mg. He stated that he had the ablation at [facility]. He read that some of the side effects are dehradation and decrease BP. .. Should he take it at different times of his other medications. And should he increase his fluid intake? BP has been 89/70.  97/65, 102 /70, but does not have any symptoms of dizziness. HR is 70's. He is having a BMP in 10-14 days after starting [facility]. Please advise. ..

## 2022-10-10 ENCOUNTER — TELEPHONE (OUTPATIENT)
Dept: CARDIOLOGY CLINIC | Age: 72
End: 2022-10-10

## 2022-10-10 DIAGNOSIS — R06.02 SOB (SHORTNESS OF BREATH): Primary | ICD-10-CM

## 2022-10-10 DIAGNOSIS — I50.9 CONGESTIVE HEART FAILURE, UNSPECIFIED HF CHRONICITY, UNSPECIFIED HEART FAILURE TYPE (HCC): ICD-10-CM

## 2022-10-10 DIAGNOSIS — I25.10 CORONARY ARTERY DISEASE INVOLVING NATIVE CORONARY ARTERY OF NATIVE HEART WITHOUT ANGINA PECTORIS: ICD-10-CM

## 2022-10-10 NOTE — TELEPHONE ENCOUNTER
Patient called with updates and concerns for Dr Rosy Montes. Patient started his Suzzanne Sicks 9/29/22. Since starting this medication his Glucose has been running . His BP has increased a little. Has been running /65-70 with HR in the 60's. He did have a repeat lab test last Friday at [facility] to check his labs since starting the Suzzanne Sicks. His Iron was low, His Hgb/Hct was low. His PCP Dr Petersen Brothers started him on some Iron tabs but is concerned with Leland Lawrence BP being so low and his Kidney function is worse than it was before. Before starting the Codie Bello reports his BP was running 93-97/60's since having his ablation 9/22. Pranay Perea takes his Raymondo Talon 1/2 tab in am and 1/2 tab in the pm. He has been feeling tired and does have intermittnet shortness of breath. Some days he can walk 300-400 feet with no issues until towards the end he gets tired and winded and then there are some days after sitting he gets up and walks just 20 feet to go to the bathroon and he gets so out of breath. He gets winded if bends over for any length of time or doing stairs or steep inclines. Denies any CP/pressure, Dizziness/Lightheadedness. BP before medication this morning was 103/66 HR 65 and 1 hour after medication was 102/67  HR 66.      Please advise

## 2022-10-13 DIAGNOSIS — I25.10 CORONARY ARTERY DISEASE INVOLVING NATIVE CORONARY ARTERY OF NATIVE HEART WITHOUT ANGINA PECTORIS: ICD-10-CM

## 2022-10-13 DIAGNOSIS — I50.9 CONGESTIVE HEART FAILURE, UNSPECIFIED HF CHRONICITY, UNSPECIFIED HEART FAILURE TYPE (HCC): ICD-10-CM

## 2022-10-13 DIAGNOSIS — R06.02 SOB (SHORTNESS OF BREATH): ICD-10-CM

## 2022-10-17 ENCOUNTER — OFFICE VISIT (OUTPATIENT)
Dept: CARDIOLOGY CLINIC | Age: 72
End: 2022-10-17
Payer: COMMERCIAL

## 2022-10-17 VITALS
WEIGHT: 258 LBS | HEART RATE: 87 BPM | OXYGEN SATURATION: 93 % | SYSTOLIC BLOOD PRESSURE: 102 MMHG | BODY MASS INDEX: 39.23 KG/M2 | DIASTOLIC BLOOD PRESSURE: 60 MMHG

## 2022-10-17 DIAGNOSIS — I25.10 CORONARY ARTERY DISEASE INVOLVING NATIVE CORONARY ARTERY OF NATIVE HEART WITHOUT ANGINA PECTORIS: Primary | ICD-10-CM

## 2022-10-17 DIAGNOSIS — R06.02 SOB (SHORTNESS OF BREATH): ICD-10-CM

## 2022-10-17 PROCEDURE — 1123F ACP DISCUSS/DSCN MKR DOCD: CPT | Performed by: INTERNAL MEDICINE

## 2022-10-17 PROCEDURE — 99214 OFFICE O/P EST MOD 30 MIN: CPT | Performed by: INTERNAL MEDICINE

## 2022-10-17 RX ORDER — SPIRONOLACTONE 25 MG/1
25 TABLET ORAL 2 TIMES DAILY
COMMUNITY
End: 2022-10-20 | Stop reason: DRUGHIGH

## 2022-10-17 RX ORDER — FUROSEMIDE 20 MG/1
TABLET ORAL
COMMUNITY
End: 2022-10-20 | Stop reason: DRUGHIGH

## 2022-10-17 RX ORDER — FERROUS SULFATE 325(65) MG
TABLET ORAL
COMMUNITY
Start: 2022-10-08

## 2022-10-17 RX ORDER — METOPROLOL SUCCINATE 50 MG/1
25 TABLET, EXTENDED RELEASE ORAL DAILY
COMMUNITY

## 2022-10-17 NOTE — PATIENT INSTRUCTIONS
Decrease water intake for now     Decrease Metoprolol 50 mg to 1/2 tablet daily   (Will be 25 mg daily )    Sissy Levy for now     INCREASE Lasix 20 mg to   2 tablets in am (40 mg) and 1 tablet in evening (20 mg)    INCREASE Aldactone 25 mg to one tablet twice a day     Will check cbc/cmp on Thursday     Monitor BP/HR daily

## 2022-10-17 NOTE — PROGRESS NOTES
Ov Dr. Ro Alvarez for follow up   C/o sob weakness   Worse since on Farxiga   No edema   Had an ablation last month  Pt walked chaudhari  Lowest O2 reading was 96  Mainly stated at 98      Decrease water intake for now     Decrease Metoprolol 50 mg to 1/2 tablet daily   (Will be 25 mg daily )    Jama Escudero for now     INCREASE Lasix 20 mg to   2 tablets in am (40 mg) and 1 tablet in evening (20 mg)    INCREASE Aldactone 25 mg to one tablet twice a day     Will check cbc/cmp on Thursday     Monitor BP/HR daily

## 2022-10-19 NOTE — PROGRESS NOTES
Kasia Watson M.D. [address] [place] [address] Gavin Arreola [address]  [phone]        2022        Robert Triplett MD  [address] [place] [address]    RE:   Noemy Álvarez. :  1950    Dear Dr. Tanvi Bradshaw:    HISTORY OF PRESENT ILLNESS:  I saw Baylee Allison in our office on 10/17/2022, by his request.  He complained of shortness of breath and marked weakness over the last several weeks. His shortness of breath has increased to the point that he has difficulty dressing himself. He has had some increase in edema in his lower extremities. He denies any chest pain or chest discomfort. He has not noted hypoxia or any chest pain but does complain of marked shortness of breath. He has difficulty lying down. He saw me in my office today. As you know, he was seen in the emergency room at [facility] on 2022. He was in tachycardia and he was transferred to [facility] on 2022. At that time, he was found to be in atrial flutter with a rate of 120. He had an atrial flutter ablation on 2022, by Dr. Alda Perez. This was successful and he was discharged. He had been given [place] for cardioprotection, and he called us on . His blood pressure had been in the 80s to 90s with mild dizziness. Because of his low blood pressure, we had him hold his Krystina Mad and he did start [place]. Again, in the last two weeks, he has had more shortness of breath and loss of loss of energy. He has done no activity and has difficulty with walking down the hallway. He did have blood work today at [facility]. His white count was 6.5 with hemoglobin 10.7 and platelet count 241,366. His BUN is 46 with creatinine increased to 299. He does have an extensive cardiac history. For our records, he had a myocardial infarction on 10/19/1999 and was given tPA and a catheterization showed severe triple-vessel disease.   He had open heart surgery by Dr. Anat Mendez on 12/25/1999, with a radial bypass graft sequentially to the PDA and posterior ventricular branch of the right coronary artery and free right internal mammary artery bypass to the diagonal and two branches of the circumflex and LIMA to the LAD. He had a catheterization on 11/02/2007 showing patent bypass grafts with occluded posterolateral branch of the right coronary artery, EF was 30% to 35%, and a single-lead ICD was placed on 12/26/2007 by Dr. Heavenly Padilla. On 12/02/2014, he had ventricular tachycardia and was recommended to go on long-term Cordarone. He had Evera Medtronic single-lead ICD placed by Dr. Devang Galvez. In 03/2019, he had QRS prolongation of the left bundle-branch block and I felt he should be considered for biventricular ICD. Dr. Ariela Boucher did not feel that he met criteria and had ablation by Dr. Ruma Jiménez on 07/29/2019. He then followed up with Dr. Ruma Jiménez who upgraded his ICD to biventricular ICD on 01/13/2020, placing the atrial lead and a left ventricular lead. On 09/14/2020, we decreased his amiodarone to 200 mg 5 days a week and started him on Entresto, increased Aldactone and Lasix. He did have a ventricular storm on 09/29/2020, and his amiodarone was increased to 200 mg daily. He had two episodes of ventricular tachycardia and he had ventricular ablation on 11/25/2020. I did try him on [place] and after which he deteriorated. He had more shortness of breath and I stopped his [place] and placed him back on Aldactone. He was hospitalized on 03/24/2022, and had a right-sided catheterization that showed a wedge of 38. He again saw me on 08/09/2022, and at that time, he was doing fairly well. He then was seen in the ER for atrial flutter with a rapid ventricular response at 120 beats per minute on 09/19, followed by his transfer to [facility] and atrial flutter ablation by Dr. Ruma Jiménez.     CARDIAC RISK FACTORS:  Known CAD: Positive. Hypertension:  Positive. Hyperlipidemia:  Positive. Peripheral Vascular Disease:  Negative. Smoking:  Negative. Diabetes:  Positive. Other Family Members:  Positive. MEDICATIONS AT HOME:  He is on Farxiga 10 mg daily, amiodarone 200 mg daily, Lipitor 80 mg daily, Eliquis 5 mg b.i.d., iron 5 grains daily, Advair 1 puff q.12 hours, Lasix 20 mg b.i.d., Amaryl 1 mg daily, Synthroid 80 mcg daily, Toprol-XL 50 mg daily, spironolactone 25 mg daily, Cialis 20 mg p.r.n., Cialis 5 mg daily, Spiriva 2 puffs daily, and vitamin D 1000 units daily. PAST MEDICAL AND SURGICAL HISTORY:  1. Type 2 diabetes, well controlled. 2.  Sleep apnea, on a CPAP. 3.  Hypertension, although hypotensive now. 4.  Hyperlipidemia. 5.  Mild renal insufficiency, slightly increased at this point. 6.  Euthyroid, on treatment. 7.  Ablation for ventricular tachycardia on 2019, with an upgrade to biventricular ICD on 2020.  8.  Ablation on 2020.  9.  Last ablation on 2022, by Dr. Adam Padilla for atrial flutter. FAMILY HISTORY:  Mother  of Alzheimer's. Father  of MI.    SOCIAL HISTORY:  He is 67years old, [facility] in [place]. Two daughters, live in [place]. Has a 32-foot boat in [place]. Daughter is a nurse at [employer]. Wife passed away at [facility] in 2021. Does not smoke or drink alcohol. He has a significant other by the name of Anat Mcleod. REVIEW OF SYSTEMS:  Cardiac as above. Other systems reviewed including constitutional, eyes, ears, nose and throat, cardiovascular, respiratory, GI, , musculoskeletal, integumentary, neurologic, endocrine, hematologic and allergic/immunologic are negative except for what is described above. No weight loss or weight gain. No change in bowel habits. No blood in stool. No fevers, sweats or chills. PHYSICAL EXAMINATION:  VITAL SIGNS:  His blood pressure was 102/60 with a heart rate of 87 and regular. Respiratory rate 18.   O2 sat was 93%. His weight 258 pounds. GENERAL:  He is a pleasant 54-year-old gentleman, no acute distress. He was oriented to person, place and time. Answered questions appropriately. SKIN:  No unusual skin changes. HEENT:  The pupils are equally round and intact. Mucous membranes were dry. NECK:  No JVD. Good carotid pulses. No carotid bruits. No lymphadenopathy or thyromegaly. CARDIOVASCULAR EXAM:  S1 and S2 were normal.  No S3 or S4. Soft systolic blowing type murmur. No diastolic murmur. PMI was normal.  No lift, thrust, or pericardial friction rub. LUNGS:  Fairly clear to auscultation and percussion. ABDOMEN:  Soft and nontender. Good bowel sounds. EXTREMITIES:  Good femoral pulses. Good pedal pulses. He had 2 to 3+ edema bilateral.  Skin was warm and dry. No calf tenderness. NEUROLOGIC EXAM:  Unremarkable. LABORATORY DATA:  From [facility], his white count was 6.52, hemoglobin 10.7, with a platelet count 154,563. His sodium 143, potassium 4.8, BUN was 46. Creatinine increased to 2.99.  AST was 21, ALT was 34. His hemoglobin A1c was 6.9.  TSH was 7.40 with a free T4 normal at 1.4. His chest x-ray on 10/11/2017, he did have pulmonary vascular congestion. EKG showed AV dual-paced rhythm. We checked his ICD today and he is in sinus rhythm. His OptiVol was markedly elevated consistent with fluid overload. IMPRESSION:  1. Increasing shortness of breath over the last two weeks, with him being able to do very little activity because of marked shortness of breath, worse since being on Farxiga. 2.  Normal-functioning ICD, although OptiVol very elevated consistent with fluid overload. 3.  Severe LV dysfunction with ejection fraction in the 30% range. 4.  Hypotension, with him not on Entresto at this point because of hypotension.   5.  Atrial flutter with ablation by Dr. Catarino Diaz on 07/29/2019, with another episode of slow atrial flutter on 05/17/2022, which converted to sinus rhythm. 6.  On amiodarone 200 mg daily. 7.  On Eliquis 5 mg b.i.d.  8.  Open heart surgery by Dr. Luis Miguel Mccollum on 12/25/1999, with a radial bypass graft to the PDA and posterior ventricular branch of the right coronary artery with a free RORO to the diagonal and two branches of the circumflex and LIMA to the LAD. 9.  Catheterization on 03/04/2015, showed occluded radial bypass graft to the right coronary artery as well as occluded right coronary artery. 10.  ICD implanted on 12/26/2007 by Dr. Lowell Coats, with a generator change on 12/03/2014 and a biventricular upgrade by Dr. Ashwin Caldwell using a Medtronic Quad biventricular ICD on 01/13/2020, still at beginning of life. 11.  Chronic renal insufficiency with creatinine in the 2.2 range. 12.  VT ablation by Dr. Ashwin Caldwell on 11/25/2020. 13. On Farxiga 10 mg daily for renal and cardiac protection for the last two weeks. 14.  Last ablation for atrial flutter on 09/22/2022, by Dr. Ashwin Caldwell, with him currently in sinus rhythm. 15.  Increase in OptiVol indicating also fluid overload. PLAN:  1. Decrease metoprolol from 50 mg to a half a tablet or 25 mg daily. 2.  Velta [place] for now. 3.  Increase Lasix 20 mg to two tablets in the morning and one tablet on the evening. 4.  Increase Aldactone to 25 mg b.i.d.  5.  Check CBC and CMP on Thursday. 6.  Will monitor blood pressure and heart rate daily. DISCUSSION:  Gagan Patel has had increasing shortness of breath over the last two weeks. He has also been on [place] for the last two weeks. I am not sure that this is related, but I will have him hold his [place] as he is certainly short of breath at this time. I do believe that he is fluid overloaded as a cause for his shortness of breath. His OptiVol was very elevated on his ICD and he has had more edema. Therefore, I will increase his Lasix to 40 mg in the morning and 20 mg in the evening and also increase his Aldactone to 25 mg b.i.d.     We would need to be very careful as his creatinine is elevated at 2.99, which is the highest it has been for quite some time. Therefore, we will get another CMP and CBC on Thursday in four days to monitor. He will monitor his blood pressure and heart rate daily. I was concerned that he might be in atrial flutter again, but it appears that he is in sinus rhythm by his interrogation of the ICD. Again, he is not on Entresto at this time because of hypotension. I will meet with him again in approximately two weeks, we will be in touch over the phone to see how he does with the above changes. Thank you very much for allowing me the privilege of seeing Vishal Russell. If you have any questions on my thoughts, please do not hesitate to contact me.      Sincerely,        Riddhi Almanza    D: 10/18/2022 5:53:25     T: 10/18/2022 5:59:59     [email or web address]  Job#: [id number]   Doc#: [id number]

## 2022-10-20 ENCOUNTER — TELEPHONE (OUTPATIENT)
Dept: CARDIOLOGY CLINIC | Age: 72
End: 2022-10-20

## 2022-10-20 DIAGNOSIS — N28.9 KIDNEY INSUFFICIENCY: Primary | ICD-10-CM

## 2022-10-20 RX ORDER — SPIRONOLACTONE 25 MG/1
25 TABLET ORAL 2 TIMES DAILY
Qty: 30 TABLET | Refills: 1 | Status: SHIPPED
Start: 2022-10-20 | End: 2022-10-24

## 2022-10-20 RX ORDER — FUROSEMIDE 20 MG/1
20 TABLET ORAL 2 TIMES DAILY
Qty: 60 TABLET | Refills: 1 | Status: SHIPPED
Start: 2022-10-20

## 2022-10-24 RX ORDER — SPIRONOLACTONE 25 MG/1
TABLET ORAL
Qty: 180 TABLET | Refills: 1 | Status: SHIPPED | OUTPATIENT
Start: 2022-10-24

## 2022-10-25 ENCOUNTER — TELEPHONE (OUTPATIENT)
Dept: CARDIOLOGY CLINIC | Age: 72
End: 2022-10-25

## 2022-10-25 DIAGNOSIS — I10 PRIMARY HYPERTENSION: ICD-10-CM

## 2022-10-25 DIAGNOSIS — I25.10 CORONARY ARTERY DISEASE INVOLVING NATIVE CORONARY ARTERY OF NATIVE HEART WITHOUT ANGINA PECTORIS: Primary | ICD-10-CM

## 2022-10-25 NOTE — TELEPHONE ENCOUNTER
Dr Dominic Solis spoke to pt   1) will restart Entresto 1/2 bid  2) will decrease lasix to qd   3) will decrease aldactone to qd  4) will Call Dr Dominic Solis on Friday  5) will get labs on Monday

## 2022-11-01 ENCOUNTER — TELEPHONE (OUTPATIENT)
Dept: CARDIOLOGY CLINIC | Age: 72
End: 2022-11-01

## 2022-11-14 RX ORDER — AMIODARONE HYDROCHLORIDE 200 MG/1
TABLET ORAL
Qty: 90 TABLET | Refills: 3 | Status: SHIPPED | OUTPATIENT
Start: 2022-11-14

## 2022-11-28 ENCOUNTER — TELEPHONE (OUTPATIENT)
Dept: CARDIOLOGY CLINIC | Age: 72
End: 2022-11-28

## 2022-11-28 NOTE — TELEPHONE ENCOUNTER
Timmy Esparza called to state that he saw Dr. Lamberto Higgins at [facility] for a consult recommended by Dr. Hubert Khan. He stated that the consult was so that he would be able to do a direct admit if Timmy Esparza needed to be admitted instead of waiting in the ED. He stated that Dr. Vieira Sites notes are in the chart and he agreed with Dr. Olman Pompa treatment, however, he will be established with Dr. Lamberto Higgins for admissions at [facility]. He stated that he is feeling better than he did 4 weeks ago when he was here. Just wanted to update Dr. Wing Dailey.

## 2022-12-28 RX ORDER — SACUBITRIL AND VALSARTAN 24; 26 MG/1; MG/1
TABLET, FILM COATED ORAL
Qty: 180 TABLET | Refills: 3 | Status: SHIPPED | OUTPATIENT
Start: 2022-12-28

## 2023-01-23 RX ORDER — AMIODARONE HYDROCHLORIDE 200 MG/1
TABLET ORAL
Qty: 90 TABLET | Refills: 3 | Status: SHIPPED | OUTPATIENT
Start: 2023-01-23

## 2023-01-23 NOTE — TELEPHONE ENCOUNTER
Patient is almost out of his Amiodarone 200mg dqily.   Asked if refill could be sent to [address]

## 2023-02-01 DIAGNOSIS — I50.9 OTHER CONGESTIVE HEART FAILURE (HCC): ICD-10-CM

## 2023-02-01 DIAGNOSIS — I34.0 NONRHEUMATIC MITRAL VALVE REGURGITATION: Primary | ICD-10-CM

## 2023-02-07 ENCOUNTER — HOSPITAL ENCOUNTER (OUTPATIENT)
Age: 73
Discharge: HOME | End: 2023-02-09
Payer: COMMERCIAL

## 2023-02-07 ENCOUNTER — HOSPITAL ENCOUNTER (OUTPATIENT)
Dept: GENERAL RADIOLOGY | Age: 73
Discharge: HOME | End: 2023-02-09
Payer: COMMERCIAL

## 2023-02-07 ENCOUNTER — HOSPITAL ENCOUNTER (OUTPATIENT)
Age: 73
Discharge: HOME | End: 2023-02-07
Payer: COMMERCIAL

## 2023-02-07 ENCOUNTER — OFFICE VISIT (OUTPATIENT)
Dept: CARDIOLOGY CLINIC | Age: 73
End: 2023-02-07
Payer: COMMERCIAL

## 2023-02-07 VITALS
DIASTOLIC BLOOD PRESSURE: 60 MMHG | SYSTOLIC BLOOD PRESSURE: 102 MMHG | OXYGEN SATURATION: 90 % | HEART RATE: 88 BPM | BODY MASS INDEX: 38.01 KG/M2 | WEIGHT: 250 LBS

## 2023-02-07 DIAGNOSIS — I34.0 NONRHEUMATIC MITRAL VALVE REGURGITATION: ICD-10-CM

## 2023-02-07 DIAGNOSIS — I50.9 OTHER CONGESTIVE HEART FAILURE (HCC): ICD-10-CM

## 2023-02-07 DIAGNOSIS — I10 PRIMARY HYPERTENSION: ICD-10-CM

## 2023-02-07 DIAGNOSIS — Z95.810 ICD (IMPLANTABLE CARDIOVERTER-DEFIBRILLATOR) IN PLACE: ICD-10-CM

## 2023-02-07 DIAGNOSIS — I25.10 CORONARY ARTERY DISEASE INVOLVING NATIVE CORONARY ARTERY OF NATIVE HEART WITHOUT ANGINA PECTORIS: ICD-10-CM

## 2023-02-07 DIAGNOSIS — E78.00 PURE HYPERCHOLESTEROLEMIA: ICD-10-CM

## 2023-02-07 DIAGNOSIS — I50.9 CONGESTIVE HEART FAILURE, UNSPECIFIED HF CHRONICITY, UNSPECIFIED HEART FAILURE TYPE (HCC): ICD-10-CM

## 2023-02-07 DIAGNOSIS — D64.9 ANEMIA, UNSPECIFIED TYPE: ICD-10-CM

## 2023-02-07 DIAGNOSIS — I25.10 CORONARY ARTERY DISEASE INVOLVING NATIVE CORONARY ARTERY OF NATIVE HEART WITHOUT ANGINA PECTORIS: Primary | ICD-10-CM

## 2023-02-07 LAB
ABSOLUTE EOS #: 0.2 K/UL (ref 0–0.4)
ABSOLUTE LYMPH #: 0.8 K/UL (ref 1–4.8)
ABSOLUTE MONO #: 0.6 K/UL (ref 0–1)
ALBUMIN SERPL-MCNC: 4.1 G/DL (ref 3.5–5.2)
ALP SERPL-CCNC: 149 U/L (ref 40–129)
ALT SERPL-CCNC: 26 U/L (ref 5–41)
ANION GAP SERPL CALCULATED.3IONS-SCNC: 13 MMOL/L (ref 9–17)
AST SERPL-CCNC: 32 U/L
BASOPHILS # BLD: 1 % (ref 0–2)
BASOPHILS ABSOLUTE: 0 K/UL (ref 0–0.2)
BILIRUB SERPL-MCNC: 1.6 MG/DL (ref 0.3–1.2)
BUN SERPL-MCNC: 35 MG/DL (ref 8–23)
BUN/CREAT BLD: 16 (ref 9–20)
CALCIUM SERPL-MCNC: 9.5 MG/DL (ref 8.6–10.4)
CHLORIDE SERPL-SCNC: 106 MMOL/L (ref 98–107)
CHOLEST SERPL-MCNC: 106 MG/DL
CHOLESTEROL/HDL RATIO: 2.5
CO2 SERPL-SCNC: 22 MMOL/L (ref 20–31)
CREAT SERPL-MCNC: 2.18 MG/DL (ref 0.7–1.2)
DIFFERENTIAL TYPE: YES
EOSINOPHILS RELATIVE PERCENT: 2 % (ref 0–5)
FERRITIN SERPL-MCNC: 139 NG/ML (ref 30–400)
GFR SERPL CREATININE-BSD FRML MDRD: 31 ML/MIN/1.73M2
GLUCOSE SERPL-MCNC: 114 MG/DL (ref 70–99)
HCT VFR BLD AUTO: 38.5 % (ref 41–53)
HDLC SERPL-MCNC: 43 MG/DL
HGB BLD-MCNC: 12.9 G/DL (ref 13.5–17.5)
IRON SATURATION: 33 % (ref 20–55)
IRON SERPL-MCNC: 109 UG/DL (ref 59–158)
LDLC SERPL CALC-MCNC: 51 MG/DL (ref 0–130)
LYMPHOCYTES # BLD: 13 % (ref 13–44)
MAGNESIUM SERPL-MCNC: 2.4 MG/DL (ref 1.6–2.6)
MCH RBC QN AUTO: 33.6 PG (ref 26–34)
MCHC RBC AUTO-ENTMCNC: 33.3 G/DL (ref 31–37)
MCV RBC AUTO: 100.8 FL (ref 80–100)
MONOCYTES # BLD: 9 % (ref 5–9)
PATIENT FASTING?: YES
PDW BLD-RTO: 18.3 % (ref 12.1–15.2)
PLATELET # BLD AUTO: 165 K/UL (ref 140–450)
POTASSIUM SERPL-SCNC: 4.5 MMOL/L (ref 3.7–5.3)
PROT SERPL-MCNC: 6.7 G/DL (ref 6.4–8.3)
RBC # BLD: 3.82 M/UL (ref 4.5–5.9)
SEG NEUTROPHILS: 75 % (ref 39–75)
SEGMENTED NEUTROPHILS ABSOLUTE COUNT: 4.8 K/UL (ref 2.1–6.5)
SODIUM SERPL-SCNC: 141 MMOL/L (ref 135–144)
TIBC SERPL-MCNC: 335 UG/DL (ref 250–450)
TRIGL SERPL-MCNC: 61 MG/DL
TSH SERPL-ACNC: 20.4 UIU/ML (ref 0.3–5)
UNSATURATED IRON BINDING CAPACITY: 226 UG/DL (ref 112–347)
WBC # BLD AUTO: 6.4 K/UL (ref 3.5–11)

## 2023-02-07 PROCEDURE — 83550 IRON BINDING TEST: CPT

## 2023-02-07 PROCEDURE — 84443 ASSAY THYROID STIM HORMONE: CPT

## 2023-02-07 PROCEDURE — 80053 COMPREHEN METABOLIC PANEL: CPT

## 2023-02-07 PROCEDURE — 3078F DIAST BP <80 MM HG: CPT | Performed by: INTERNAL MEDICINE

## 2023-02-07 PROCEDURE — 83735 ASSAY OF MAGNESIUM: CPT

## 2023-02-07 PROCEDURE — 80061 LIPID PANEL: CPT

## 2023-02-07 PROCEDURE — 3074F SYST BP LT 130 MM HG: CPT | Performed by: INTERNAL MEDICINE

## 2023-02-07 PROCEDURE — 36415 COLL VENOUS BLD VENIPUNCTURE: CPT

## 2023-02-07 PROCEDURE — 99214 OFFICE O/P EST MOD 30 MIN: CPT | Performed by: INTERNAL MEDICINE

## 2023-02-07 PROCEDURE — 71046 X-RAY EXAM CHEST 2 VIEWS: CPT

## 2023-02-07 PROCEDURE — 93005 ELECTROCARDIOGRAM TRACING: CPT

## 2023-02-07 PROCEDURE — 93289 INTERROG DEVICE EVAL HEART: CPT | Performed by: INTERNAL MEDICINE

## 2023-02-07 PROCEDURE — 82728 ASSAY OF FERRITIN: CPT

## 2023-02-07 PROCEDURE — 83540 ASSAY OF IRON: CPT

## 2023-02-07 PROCEDURE — 1123F ACP DISCUSS/DSCN MKR DOCD: CPT | Performed by: INTERNAL MEDICINE

## 2023-02-07 PROCEDURE — 85025 COMPLETE CBC W/AUTO DIFF WBC: CPT

## 2023-02-07 PROCEDURE — 84439 ASSAY OF FREE THYROXINE: CPT

## 2023-02-07 RX ORDER — TADALAFIL 5 MG/1
5 TABLET ORAL DAILY
Qty: 90 TABLET | Refills: 3 | Status: SHIPPED | OUTPATIENT
Start: 2023-02-07

## 2023-02-07 RX ORDER — METOPROLOL SUCCINATE 50 MG/1
25 TABLET, EXTENDED RELEASE ORAL DAILY
Qty: 90 TABLET | Refills: 3 | Status: SHIPPED | OUTPATIENT
Start: 2023-02-07

## 2023-02-07 RX ORDER — FUROSEMIDE 20 MG/1
40 TABLET ORAL EVERY MORNING
COMMUNITY

## 2023-02-07 RX ORDER — PREDNISONE 20 MG/1
40 TABLET ORAL DAILY
Qty: 10 TABLET | Refills: 0 | Status: SHIPPED | OUTPATIENT
Start: 2023-02-07 | End: 2023-02-12

## 2023-02-07 NOTE — PATIENT INSTRUCTIONS
Ok to STOP Iron tablets    START Farxiga 10mg 1/2 (HALF) tablet daily     INCREASE Lasix 20 mg to 2 tablet in AM     Will check BMP in 2 weeks     Will give Prednisone 40 mg daily x 5 days     Will follow up in 3 months

## 2023-02-07 NOTE — PROGRESS NOTES
Ov Dr. Elsy Pemberton for 6 month f/u  ICD checked by Medtr[employer]   No chest pain   No palpations   C/o right shoulder pain   Had a massage 2 weeks ago   Now black and blue   Had covid in dec        Ok to STOP Iron tablets    START Farxiga 10mg 1/2 (HALF) tablet daily     INCREASE Lasix 20 mg to 2 tablet in AM     Will check BMP in 2 weeks     Will give Prednisone 40 mg daily x 5 days     Will follow up in 3 months

## 2023-02-08 ENCOUNTER — TELEPHONE (OUTPATIENT)
Dept: CARDIOLOGY CLINIC | Age: 73
End: 2023-02-08

## 2023-02-08 LAB
EKG ATRIAL RATE: 82 BPM
EKG P AXIS: -28 DEGREES
EKG Q-T INTERVAL: 466 MS
EKG QRS DURATION: 156 MS
EKG QTC CALCULATION (BAZETT): 544 MS
EKG R AXIS: 142 DEGREES
EKG T AXIS: -33 DEGREES
EKG VENTRICULAR RATE: 82 BPM
T4 FREE SERPL-MCNC: 1.74 NG/DL (ref 0.93–1.7)

## 2023-02-08 PROCEDURE — 93010 ELECTROCARDIOGRAM REPORT: CPT | Performed by: INTERNAL MEDICINE

## 2023-02-15 NOTE — PROGRESS NOTES
Adelaida Dang M.D. [address] [place] [address] Gavin Arreola 79  [phone]        2023        Shannan Mcguire MD  [address] [place] [address]    RE:     :  1950    Dear Dr. Zahraa Salinas:    CHIEF COMPLAINT:  1. Shortness of breath. 2.  Status post atrial flutter ablation by Dr. Yosef Elliott on 2022.  3.  Sleep apnea. HISTORY OF PRESENT ILLNESS:  I had the pleasure of seeing Mr. Jasmine in our office on 2023. He has an extensive cardiac history. He had a myocardial infarction on 10/19/1999, was given tPA, and a catheterization showed severe triple vessel disease. He had an open heart surgery by Dr. April Arroyo on 1999, with a radial bypass graft sequentially to the PDA and posterior ventricular branch of the right coronary artery, a free right internal mammary artery bypass graft to the diagonal, and two branches of the circumflex and a LIMA to the LAD. A catheterization on 2007 showed patent bypass grafts with an occluded posterolateral branch of the right coronary artery, EF of 30% to 35% and a single-lead ICD was placed on 2007, by Dr. Feli Campos. On 2014, he had ventricular tachycardia and recommended going long-term Cordarone. He had an Evera Medtronic generator change placed by Dr. Saira Carter. In 2019, he had prolongation of the QRS and I felt he should be considered biventricular ICD. Dr. Felipe Dobbs did not feel that he met criteria and he had ablation by Dr. Yosef Elliott on 2019. He then followed up with Dr. Yosef Elliott who then upgraded his ICD to biventricular ICD on 2020, placing an atrial lead and a left ventricular lead. On 2020, we decreased his amiodarone to 200 mg 5 days a week and started him on Entresto. He had a ventricular storm on 2020 and his amiodarone was increased to 200 mg daily.     He had two episodes of ventricular tachycardia and he had ventricular ablation on 2020. I then tried him on [place]. He then deteriorated. He had more shortness of breath and I stopped his [place] and placed him back on Aldactone. He was hospitalized on 2022 and a right-sided catheterization showed a wedge of 38 and he was diuresed. On 2022, he developed atrial flutter with RVR, transferred to [facility] and atrial flutter ablation by Dr. Alicia Paredes. I last saw him on 10/17/2022. He has done fairly well since I saw him. He did have a sleep study and again confirms sleep apnea. They are trying him on some masks. He denies any chest.  He has had no palpitations. He does complain of right shoulder pain and he did have COVID in December. CARDIAC RISK FACTORS:  Known CAD:  Positive. Hypertension:  Positive. Hyperlipidemia:  Positive. Peripheral Vascular Disease:  Negative. Smoking:  Negative. Diabetes:  Positive. Other Family Members:  Positive. MEDICATIONS AT HOME:  He is on albuterol p.r.n., Cordarone 200 mg daily, aspirin 81 mg daily, Lipitor 80 mg daily, Eliquis 5 mg b.i.d., Entresto  half a tablet b.i.d., Allegra p.r.n., Advair every 12 hours, Lasix 20 mg one tablet daily, Amaryl 1 mg daily, Synthroid 88 mcg daily, Toprol XL 50 mg half a tablet daily, multivitamins daily, Aldactone 25 mg daily, Cialis 20 mg p.r.n. with 5 mg daily, Spiriva 2 puffs daily, vitamin D 1000 units daily, iron 5 grains daily. PAST MEDICAL AND SURGICAL HISTORY:  1. Type 2 diabetes, well controlled. 2.  Sleep apnea, on a CPAP mask. 3.  Hypertension. 4.  Hyperlipidemia. 5.  Mild renal insufficiency. 6.  Euthyroid, on treatment. 7.  Cardiac as above. FAMILY HISTORY:  Mother  of Alzheimer's. Father  of an MI.    SOCIAL HISTORY:  He is 67years old, [facility] in [place]. Two daughters. One daughter is a nurse at [employer]. He has a 32-foot boat in [place].   He does not smoke or drink alcohol. Significant other who is a speech therapist by the name of Lona Kuhn. REVIEW OF SYSTEMS:  Cardiac as above. Other systems reviewed including constitutional, eyes, ears, nose and throat, cardiovascular, respiratory, GI, , musculoskeletal, integumentary, neurologic, endocrine, hematologic and allergic/immunologic, are negative except for what is described above. No weight loss or weight gain. No change in bowel habits. No blood in stools. No fevers, sweats or chills. PHYSICAL EXAMINATION:  VITAL SIGNS:  His blood pressure was 102/60 with a heart rate of 88 and regular. Respirations were 18. O2 saturation was 90%. Weight 250 pounds. GENERAL:  He is a pleasant 65-year-old gentleman. Denied pain. He was oriented to person, place and time. Answered questions appropriately. SKIN:  No unusual skin changes. HEENT:  The pupils are equally round and intact. Mucous membranes were dry. NECK:  No JVD. Good carotid pulses. No carotid bruits. No lymphadenopathy or thyromegaly. CARDIOVASCULAR EXAM:  S1 and S2 were normal.  No S3 or S4. Soft systolic blowing type murmur. No diastolic murmur. PMI was normal.  No lift, thrust, or pericardial friction rub. LUNGS:  Clear to auscultation and percussion. ABDOMEN:  Soft and nontender. Good bowel sounds. EXTREMITIES:  Good femoral pulses. Good pedal pulses. He had 2 to 3+ edema. Skin was warm and dry. No calf tenderness. Nail beds pink. Good cap refill. PULSES:  Bilateral symmetrical radial, brachial and carotid pulses. No carotid bruits. Good femoral and pedal pulses. NEUROLOGIC EXAM:  Within normal limits. PSYCHIATRIC EXAM:  Within normal limits. LABORATORY DATA:  Sodium was 141, potassium 4.5, BUN 35, creatinine 2.18 which is about his baseline, GFR 31. Magnesium 2.4. Glucose 114. Calcium 9.5. Cholesterol 106, HDL was 43, LDL 51, triglycerides 61. ALT was 26, AST was 32. TSH was 20.4 with a free thyroxine of 1.74. White count 6.4, hemoglobin 12.9 with a platelet count of 459,759. His iron level was normal at 109 with a ferritin normal at 139. ICD was interrogated, still at beginning of life. EKG showed AV dual-paced rhythm with biventricular ICD. Chest x-ray was unremarkable. IMPRESSION:  1. Normal functioning ICD. 2.  Severe LV dysfunction, EF in the 30% range. 3.  Hypotension, although asymptomatic at this time. 4.  Atrial flutter with ablation by Dr. Alicia Paredes on 07/29/2019 with atrial flutter again on 05/17/2022, was converted to sinus rhythm. 5.  Amiodarone 200 mg daily. 6.  Eliquis 5 mg b.i.d.  7.  Open heart surgery by Dr. Fredi Saldana on 12/25/1999, with a radial bypass graft to the PDA and posterior ventricular branch of the right coronary artery with a free right internal mammary artery to the diagonal and two branches of circumflex and a LIMA to the LAD. 8.  Catheterization on 03/04/2015, that showed an occluded radial bypass graft to the right coronary artery as well as occluded right coronary artery. 9.  ICD placed on 12/26/2007 by Dr. Martha Durant with a generator change on 12/03/2014, and biventricular upgrade by Dr. Alicia Paredes using Medtronic Quad biventricular ICD on 01/13/2020, still at beginning of life. 10.  Chronic renal insufficiency with creatinine at baseline at 2.18.  11.  VT ablation by Dr. Alicia Paredes on 11/25/2020. 12.  Last ablation for atrial flutter on 09/22/2022 by Dr. Alicia Paredes. 13.  History of anemia, on iron, has resolved. 14. Moderate pedal edema. 15.  Pain in right shoulder. PLAN:  1. Stop iron. 2.  Start Farxiga 10 mg half a tablet daily. 3.  Increase Lasix to 20 mg two tablets in the morning. 4.  We will check BMP in two weeks. 5.  A 5-day course of prednisone 40 mg daily for his right shoulder. 6.  Follow up in 3 months. DISCUSSION:  Mr. Lucie Davidson overall is doing well. He does have fairly significant pain in his right shoulder.   As a side note, he had a massage two weeks ago and is now black and blue on his entire left arm and shoulder and chest from the massage. We had started [place] but he had become sick very quickly afterwards and I had stopped it. We will start it again at 10 mg half a tablet daily. He does have more edema and I will place him on Lasix 20 mg two tablets rather than one tablet in the morning. We will check a BMP in two weeks. He is doing more activity and exercising. His cardiac status has been stable and I will plan on seeing him in 3 months for followup. Thank you very much for allowing me the privilege of seeing Mr. Jasmine. If you have any questions on my thoughts, please do not hesitate to contact me.     Sincerely,        Meagan Pugh MD    D: 02/12/2023 6:46:33     T: 02/13/2023 0:23:54     [email or web address]  Job#: [id number]   Doc#: [id number]      <>

## 2023-02-20 LAB
BUN BLDV-MCNC: 36 MG/DL
CALCIUM SERPL-MCNC: 8.9 MG/DL
CHLORIDE BLD-SCNC: 108 MMOL/L
CO2: NORMAL
CREAT SERPL-MCNC: 1.97 MG/DL
EGFR: 35
GLUCOSE BLD-MCNC: 119 MG/DL
POTASSIUM SERPL-SCNC: 4.8 MMOL/L
SODIUM BLD-SCNC: 141 MMOL/L

## 2023-02-21 ENCOUNTER — TELEPHONE (OUTPATIENT)
Dept: CARDIOLOGY CLINIC | Age: 73
End: 2023-02-21

## 2023-02-21 DIAGNOSIS — I25.10 CORONARY ARTERY DISEASE INVOLVING NATIVE CORONARY ARTERY OF NATIVE HEART WITHOUT ANGINA PECTORIS: ICD-10-CM

## 2023-02-21 DIAGNOSIS — I10 PRIMARY HYPERTENSION: ICD-10-CM

## 2023-02-21 DIAGNOSIS — I50.9 CONGESTIVE HEART FAILURE, UNSPECIFIED HF CHRONICITY, UNSPECIFIED HEART FAILURE TYPE (HCC): ICD-10-CM

## 2023-02-21 DIAGNOSIS — Z95.810 ICD (IMPLANTABLE CARDIOVERTER-DEFIBRILLATOR) IN PLACE: ICD-10-CM

## 2023-04-05 ENCOUNTER — APPOINTMENT (OUTPATIENT)
Dept: URBAN - METROPOLITAN AREA CLINIC 204 | Age: 73
Setting detail: DERMATOLOGY
End: 2023-04-05

## 2023-04-05 DIAGNOSIS — L57.8 OTHER SKIN CHANGES DUE TO CHRONIC EXPOSURE TO NONIONIZING RADIATION: ICD-10-CM

## 2023-04-05 DIAGNOSIS — D17 BENIGN LIPOMATOUS NEOPLASM: ICD-10-CM

## 2023-04-05 PROBLEM — D23.5 OTHER BENIGN NEOPLASM OF SKIN OF TRUNK: Status: ACTIVE | Noted: 2023-04-05

## 2023-04-05 PROBLEM — D17.21 BENIGN LIPOMATOUS NEOPLASM OF SKIN AND SUBCUTANEOUS TISSUE OF RIGHT ARM: Status: ACTIVE | Noted: 2023-04-05

## 2023-04-05 PROCEDURE — 99213 OFFICE O/P EST LOW 20 MIN: CPT

## 2023-04-05 PROCEDURE — OTHER COUNSELING: OTHER

## 2023-04-05 PROCEDURE — OTHER RECOMMENDATIONS: OTHER

## 2023-04-05 PROCEDURE — OTHER MIPS QUALITY: OTHER

## 2023-04-05 ASSESSMENT — LOCATION ZONE DERM
LOCATION ZONE: TRUNK
LOCATION ZONE: ARM

## 2023-04-05 ASSESSMENT — LOCATION SIMPLE DESCRIPTION DERM
LOCATION SIMPLE: RIGHT SHOULDER
LOCATION SIMPLE: LEFT UPPER BACK

## 2023-04-05 ASSESSMENT — LOCATION DETAILED DESCRIPTION DERM
LOCATION DETAILED: RIGHT POSTERIOR SHOULDER
LOCATION DETAILED: LEFT SUPERIOR MEDIAL UPPER BACK

## 2023-04-20 ENCOUNTER — TELEPHONE (OUTPATIENT)
Dept: CARDIOLOGY CLINIC | Age: 73
End: 2023-04-20

## 2023-04-20 DIAGNOSIS — N28.9 RENAL INSUFFICIENCY: ICD-10-CM

## 2023-04-20 DIAGNOSIS — I34.0 NONRHEUMATIC MITRAL VALVE REGURGITATION: Primary | ICD-10-CM

## 2023-04-20 DIAGNOSIS — R60.0 BILATERAL LEG EDEMA: Primary | ICD-10-CM

## 2023-04-20 DIAGNOSIS — R53.83 OTHER FATIGUE: ICD-10-CM

## 2023-05-09 ENCOUNTER — OFFICE VISIT (OUTPATIENT)
Dept: CARDIOLOGY CLINIC | Age: 73
End: 2023-05-09
Payer: COMMERCIAL

## 2023-05-09 ENCOUNTER — HOSPITAL ENCOUNTER (OUTPATIENT)
Age: 73
Discharge: HOME | End: 2023-05-09
Payer: COMMERCIAL

## 2023-05-09 VITALS
SYSTOLIC BLOOD PRESSURE: 94 MMHG | DIASTOLIC BLOOD PRESSURE: 60 MMHG | HEART RATE: 76 BPM | WEIGHT: 209 LBS | OXYGEN SATURATION: 94 % | BODY MASS INDEX: 31.78 KG/M2

## 2023-05-09 DIAGNOSIS — R60.0 BILATERAL LEG EDEMA: ICD-10-CM

## 2023-05-09 DIAGNOSIS — I10 PRIMARY HYPERTENSION: ICD-10-CM

## 2023-05-09 DIAGNOSIS — I25.10 CORONARY ARTERY DISEASE INVOLVING NATIVE CORONARY ARTERY OF NATIVE HEART WITHOUT ANGINA PECTORIS: ICD-10-CM

## 2023-05-09 DIAGNOSIS — Z95.810 ICD (IMPLANTABLE CARDIOVERTER-DEFIBRILLATOR) IN PLACE: ICD-10-CM

## 2023-05-09 DIAGNOSIS — I25.10 CORONARY ARTERY DISEASE INVOLVING NATIVE CORONARY ARTERY OF NATIVE HEART WITHOUT ANGINA PECTORIS: Primary | ICD-10-CM

## 2023-05-09 DIAGNOSIS — R53.83 OTHER FATIGUE: ICD-10-CM

## 2023-05-09 DIAGNOSIS — E11.9 TYPE 2 DIABETES MELLITUS WITHOUT COMPLICATION, WITHOUT LONG-TERM CURRENT USE OF INSULIN (HCC): ICD-10-CM

## 2023-05-09 DIAGNOSIS — N28.9 RENAL INSUFFICIENCY: ICD-10-CM

## 2023-05-09 DIAGNOSIS — I50.9 CONGESTIVE HEART FAILURE, UNSPECIFIED HF CHRONICITY, UNSPECIFIED HEART FAILURE TYPE (HCC): ICD-10-CM

## 2023-05-09 DIAGNOSIS — E78.5 HYPERLIPIDEMIA, UNSPECIFIED HYPERLIPIDEMIA TYPE: ICD-10-CM

## 2023-05-09 DIAGNOSIS — I34.0 NONRHEUMATIC MITRAL VALVE REGURGITATION: ICD-10-CM

## 2023-05-09 LAB
ABSOLUTE EOS #: 0.3 K/UL (ref 0–0.4)
ABSOLUTE LYMPH #: 0.7 K/UL (ref 1–4.8)
ABSOLUTE MONO #: 0.9 K/UL (ref 0–1)
ALBUMIN SERPL-MCNC: 3.3 G/DL (ref 3.5–5.2)
ALP SERPL-CCNC: 316 U/L (ref 40–129)
ALT SERPL-CCNC: 203 U/L (ref 5–41)
ANION GAP SERPL CALCULATED.3IONS-SCNC: 12 MMOL/L (ref 9–17)
AST SERPL-CCNC: 121 U/L
BASOPHILS # BLD: 1 % (ref 0–2)
BASOPHILS ABSOLUTE: 0 K/UL (ref 0–0.2)
BILIRUB SERPL-MCNC: 1.1 MG/DL (ref 0.3–1.2)
BUN SERPL-MCNC: 106 MG/DL (ref 8–23)
BUN/CREAT BLD: 41 (ref 9–20)
CALCIUM SERPL-MCNC: 10.3 MG/DL (ref 8.6–10.4)
CHLORIDE SERPL-SCNC: 90 MMOL/L (ref 98–107)
CHOLEST SERPL-MCNC: 123 MG/DL
CHOLESTEROL/HDL RATIO: 3.8
CO2 SERPL-SCNC: 31 MMOL/L (ref 20–31)
CREAT SERPL-MCNC: 2.58 MG/DL (ref 0.7–1.2)
DIFFERENTIAL TYPE: YES
EKG ATRIAL RATE: 73 BPM
EKG P AXIS: 85 DEGREES
EKG P-R INTERVAL: 144 MS
EKG Q-T INTERVAL: 492 MS
EKG QRS DURATION: 150 MS
EKG QTC CALCULATION (BAZETT): 542 MS
EKG R AXIS: 85 DEGREES
EKG T AXIS: 105 DEGREES
EKG VENTRICULAR RATE: 73 BPM
EOSINOPHILS RELATIVE PERCENT: 3 % (ref 0–5)
FERRITIN SERPL-MCNC: 563 NG/ML (ref 30–400)
FOLATE SERPL-MCNC: 6.5 NG/ML
GFR SERPL CREATININE-BSD FRML MDRD: 25 ML/MIN/1.73M2
GLUCOSE SERPL-MCNC: 230 MG/DL (ref 70–99)
HCT VFR BLD AUTO: 31.2 % (ref 41–53)
HDLC SERPL-MCNC: 32 MG/DL
HGB BLD-MCNC: 10.3 G/DL (ref 13.5–17.5)
IRON SATURATION: 20 % (ref 20–55)
IRON SERPL-MCNC: 48 UG/DL (ref 59–158)
LDLC SERPL CALC-MCNC: 66 MG/DL (ref 0–130)
LYMPHOCYTES # BLD: 7 % (ref 13–44)
MAGNESIUM SERPL-MCNC: 2.2 MG/DL (ref 1.6–2.6)
MCH RBC QN AUTO: 31.9 PG (ref 26–34)
MCHC RBC AUTO-ENTMCNC: 33 G/DL (ref 31–37)
MCV RBC AUTO: 96.9 FL (ref 80–100)
MONOCYTES # BLD: 9 % (ref 5–9)
PATIENT FASTING?: YES
PDW BLD-RTO: 16.2 % (ref 12.1–15.2)
PLATELET # BLD AUTO: 364 K/UL (ref 140–450)
POTASSIUM SERPL-SCNC: 3.8 MMOL/L (ref 3.7–5.3)
PROT SERPL-MCNC: 8 G/DL (ref 6.4–8.3)
RBC # BLD: 3.22 M/UL (ref 4.5–5.9)
SEG NEUTROPHILS: 80 % (ref 39–75)
SEGMENTED NEUTROPHILS ABSOLUTE COUNT: 7.5 K/UL (ref 2.1–6.5)
SODIUM SERPL-SCNC: 133 MMOL/L (ref 135–144)
T4 FREE SERPL-MCNC: 1.7 NG/DL (ref 0.9–1.7)
TIBC SERPL-MCNC: 243 UG/DL (ref 250–450)
TRIGL SERPL-MCNC: 127 MG/DL
TSH SERPL-ACNC: 8.09 UIU/ML (ref 0.3–5)
UNSATURATED IRON BINDING CAPACITY: 195 UG/DL (ref 112–347)
VIT B12 SERPL-MCNC: >2000 PG/ML (ref 232–1245)
WBC # BLD AUTO: 9.4 K/UL (ref 3.5–11)

## 2023-05-09 PROCEDURE — 3074F SYST BP LT 130 MM HG: CPT | Performed by: INTERNAL MEDICINE

## 2023-05-09 PROCEDURE — 84439 ASSAY OF FREE THYROXINE: CPT

## 2023-05-09 PROCEDURE — 93289 INTERROG DEVICE EVAL HEART: CPT | Performed by: INTERNAL MEDICINE

## 2023-05-09 PROCEDURE — 1123F ACP DISCUSS/DSCN MKR DOCD: CPT | Performed by: INTERNAL MEDICINE

## 2023-05-09 PROCEDURE — 80061 LIPID PANEL: CPT

## 2023-05-09 PROCEDURE — 82607 VITAMIN B-12: CPT

## 2023-05-09 PROCEDURE — 82728 ASSAY OF FERRITIN: CPT

## 2023-05-09 PROCEDURE — 93005 ELECTROCARDIOGRAM TRACING: CPT

## 2023-05-09 PROCEDURE — 83540 ASSAY OF IRON: CPT

## 2023-05-09 PROCEDURE — 99214 OFFICE O/P EST MOD 30 MIN: CPT | Performed by: INTERNAL MEDICINE

## 2023-05-09 PROCEDURE — 82746 ASSAY OF FOLIC ACID SERUM: CPT

## 2023-05-09 PROCEDURE — 3078F DIAST BP <80 MM HG: CPT | Performed by: INTERNAL MEDICINE

## 2023-05-09 PROCEDURE — 83735 ASSAY OF MAGNESIUM: CPT

## 2023-05-09 PROCEDURE — 83550 IRON BINDING TEST: CPT

## 2023-05-09 PROCEDURE — 84443 ASSAY THYROID STIM HORMONE: CPT

## 2023-05-09 PROCEDURE — 85025 COMPLETE CBC W/AUTO DIFF WBC: CPT

## 2023-05-09 PROCEDURE — 36415 COLL VENOUS BLD VENIPUNCTURE: CPT

## 2023-05-09 PROCEDURE — 93010 ELECTROCARDIOGRAM REPORT: CPT | Performed by: INTERNAL MEDICINE

## 2023-05-09 PROCEDURE — 80053 COMPREHEN METABOLIC PANEL: CPT

## 2023-05-09 RX ORDER — POTASSIUM CHLORIDE 1500 MG/1
20 TABLET, EXTENDED RELEASE ORAL DAILY
COMMUNITY
Start: 2023-05-03

## 2023-05-09 RX ORDER — UBIDECARENONE 75 MG
50 CAPSULE ORAL DAILY
COMMUNITY

## 2023-05-09 RX ORDER — TORSEMIDE 20 MG/1
20 TABLET ORAL 2 TIMES DAILY
COMMUNITY
Start: 2023-05-03

## 2023-05-09 NOTE — PATIENT INSTRUCTIONS
Will get labs on Tuesday May 16th  (Will be getting labs weekly)    Can start CR after PT is done     Follow up in 6 weeks

## 2023-05-09 NOTE — PROGRESS NOTES
Ov Dr. Kati Whitt for follow up   Here today with son in [facility] follow up   Pt brought updates med list   Still weak  Home now   But doing rehab 3 days week   X 4 weeks   No chest pain   ICD checked today by [employer]   No sob when sleeping   No edema   Has had a wt loss of 50 pounds  Bedside echo done       Will get labs on Tuesday May 16th  (Will be getting labs weekly)    Can start CR after PT is done     Follow up in 6 weeks

## 2023-05-10 NOTE — PROGRESS NOTES
Courtney Howard M.D. [address] [place] [address], [facility]  [phone]          May 9, 2023          Geraldine Quezada, [address], [address] [facility]        RE:   Yuki Tipton  :  1950      Dear Dr. Radha Flor:    CHIEF COMPLAINT:  1. Severe cardiomyopathy, EF of 25%. 2.  Urosepsis with Klebsiella on 2023, hospitalized at [facility] with acute on chronic renal insufficiency, which is resolving. 3.  Cardiohepatic syndrome with marked elevation in liver function tests, which are slowly decreasing also. 4.  Mild hypotension, off from Toprol and Entresto and Lipitor. HISTORY OF PRESENT ILLNESS:  I had the pleasure of seeing Kasia Mcqueen and his son-in-law, Galen Hollis. Mitch Mcfarlane is an extremely complex 50-year-old gentleman who had a myocardial infarction in 10/1999, given tPA with a catheterization showing severe triple-vessel disease with open heart surgery by Dr. Lebron Roberts on 1999, with a radial bypass graft sequentially to the PDA and posterior ventricular branch of the right coronary artery, a free right internal mammary artery bypass graft to the diagonal and two branches of the circumflex, and LIMA to the LAD. A catheterization on 2007 showed patent bypass grafts with an occluded posterolateral branch of the right coronary artery, EF of 30% to 35% with a single-lead ICD placed on 2007, by Dr. Traci Ahuja. On 2014, he had ventricular tachycardia and was placed on long-term Cordarone, and Evera Medtronic generator change was done by Dr. Neris Harrison. In 2019, he had prolongation of the QRS and I felt that he should be considered for biventricular ICD. Dr. Alberto Garcia did not feel that he met criteria and he had ablation by Dr. Erin Dixon on 2019.     He then followed up with Dr. Erin Dixon who upgraded his ICD to biventricular ICD on 2020, placing an atrial lead and a

## 2023-05-11 ENCOUNTER — TELEPHONE (OUTPATIENT)
Dept: CARDIOLOGY CLINIC | Age: 73
End: 2023-05-11

## 2023-05-22 ENCOUNTER — TELEPHONE (OUTPATIENT)
Dept: CARDIOLOGY CLINIC | Age: 73
End: 2023-05-22

## 2023-05-22 DIAGNOSIS — I50.9 CHRONIC CONGESTIVE HEART FAILURE, UNSPECIFIED HEART FAILURE TYPE (HCC): ICD-10-CM

## 2023-05-22 DIAGNOSIS — N28.9 KIDNEY INSUFFICIENCY: Primary | ICD-10-CM

## 2023-05-22 NOTE — TELEPHONE ENCOUNTER
Dr joanna tolliver regarding her my chart message   Will decrease metoprolol to 25 mg 1/2 tab daily   Will do cbc and cmp on Thurs 5/25 at [facility]

## 2023-05-30 DIAGNOSIS — E11.9 TYPE 2 DIABETES MELLITUS WITHOUT COMPLICATION, WITHOUT LONG-TERM CURRENT USE OF INSULIN (HCC): ICD-10-CM

## 2023-05-30 DIAGNOSIS — I10 PRIMARY HYPERTENSION: Primary | ICD-10-CM

## 2023-05-30 DIAGNOSIS — E78.5 HYPERLIPIDEMIA, UNSPECIFIED HYPERLIPIDEMIA TYPE: ICD-10-CM

## 2023-05-30 DIAGNOSIS — I25.10 CORONARY ARTERY DISEASE INVOLVING NATIVE CORONARY ARTERY OF NATIVE HEART WITHOUT ANGINA PECTORIS: ICD-10-CM

## 2023-05-31 DIAGNOSIS — N28.9 KIDNEY INSUFFICIENCY: Primary | ICD-10-CM

## 2023-05-31 DIAGNOSIS — D64.9 ANEMIA, UNSPECIFIED TYPE: ICD-10-CM

## 2023-05-31 DIAGNOSIS — I10 PRIMARY HYPERTENSION: ICD-10-CM

## 2023-05-31 DIAGNOSIS — I50.9 CHRONIC CONGESTIVE HEART FAILURE, UNSPECIFIED HEART FAILURE TYPE (HCC): ICD-10-CM

## 2023-06-07 ENCOUNTER — HOSPITAL ENCOUNTER (OUTPATIENT)
Age: 73
Discharge: HOME | End: 2023-06-07
Payer: COMMERCIAL

## 2023-06-07 DIAGNOSIS — E11.9 TYPE 2 DIABETES MELLITUS WITHOUT COMPLICATION, WITHOUT LONG-TERM CURRENT USE OF INSULIN (HCC): ICD-10-CM

## 2023-06-07 DIAGNOSIS — I50.9 CHRONIC CONGESTIVE HEART FAILURE, UNSPECIFIED HEART FAILURE TYPE (HCC): ICD-10-CM

## 2023-06-07 DIAGNOSIS — N28.9 KIDNEY INSUFFICIENCY: ICD-10-CM

## 2023-06-07 DIAGNOSIS — E78.5 HYPERLIPIDEMIA, UNSPECIFIED HYPERLIPIDEMIA TYPE: ICD-10-CM

## 2023-06-07 DIAGNOSIS — D64.9 ANEMIA, UNSPECIFIED TYPE: ICD-10-CM

## 2023-06-07 DIAGNOSIS — I10 PRIMARY HYPERTENSION: ICD-10-CM

## 2023-06-07 DIAGNOSIS — I25.10 CORONARY ARTERY DISEASE INVOLVING NATIVE CORONARY ARTERY OF NATIVE HEART WITHOUT ANGINA PECTORIS: ICD-10-CM

## 2023-06-07 LAB
ALBUMIN SERPL-MCNC: 3.5 G/DL (ref 3.5–5.2)
ALP SERPL-CCNC: 174 U/L (ref 40–129)
ALT SERPL-CCNC: 28 U/L (ref 5–41)
ANION GAP SERPL CALCULATED.3IONS-SCNC: 12 MMOL/L (ref 9–17)
AST SERPL-CCNC: 23 U/L
BASOPHILS # BLD: 0 K/UL (ref 0–0.2)
BASOPHILS NFR BLD: 0 % (ref 0–2)
BILIRUB SERPL-MCNC: 0.7 MG/DL (ref 0.3–1.2)
BUN SERPL-MCNC: 54 MG/DL (ref 8–23)
BUN/CREAT SERPL: 21 (ref 9–20)
CALCIUM SERPL-MCNC: 9.5 MG/DL (ref 8.6–10.4)
CHLORIDE SERPL-SCNC: 97 MMOL/L (ref 98–107)
CO2 SERPL-SCNC: 24 MMOL/L (ref 20–31)
CREAT SERPL-MCNC: 2.61 MG/DL (ref 0.7–1.2)
EOSINOPHIL # BLD: 0.2 K/UL (ref 0–0.4)
EOSINOPHILS RELATIVE PERCENT: 3 % (ref 0–5)
ERYTHROCYTE [DISTWIDTH] IN BLOOD BY AUTOMATED COUNT: 19.3 % (ref 12.1–15.2)
GFR SERPL CREATININE-BSD FRML MDRD: 25 ML/MIN/1.73M2
GLUCOSE SERPL-MCNC: 139 MG/DL (ref 70–99)
HCT VFR BLD AUTO: 24.5 % (ref 41–53)
HGB BLD-MCNC: 8.1 G/DL (ref 13.5–17.5)
LYMPHOCYTES # BLD: 12 % (ref 13–44)
LYMPHOCYTES NFR BLD: 0.8 K/UL (ref 1–4.8)
MAGNESIUM SERPL-MCNC: 2.6 MG/DL (ref 1.6–2.6)
MCH RBC QN AUTO: 32.3 PG (ref 26–34)
MCHC RBC AUTO-ENTMCNC: 32.9 G/DL (ref 31–37)
MCV RBC AUTO: 98.4 FL (ref 80–100)
MONOCYTES NFR BLD: 0.6 K/UL (ref 0–1)
MONOCYTES NFR BLD: 10 % (ref 5–9)
MORPHOLOGY: ABNORMAL
NEUTROPHILS NFR BLD: 75 % (ref 39–75)
NEUTS SEG NFR BLD: 4.7 K/UL (ref 2.1–6.5)
PLATELET # BLD AUTO: 246 K/UL (ref 140–450)
POTASSIUM SERPL-SCNC: 4 MMOL/L (ref 3.7–5.3)
PROT SERPL-MCNC: 7.2 G/DL (ref 6.4–8.3)
RBC # BLD AUTO: 2.49 M/UL (ref 4.5–5.9)
SODIUM SERPL-SCNC: 133 MMOL/L (ref 135–144)
T4 FREE SERPL-MCNC: 1.9 NG/DL (ref 0.9–1.7)
TSH SERPL-MCNC: 5.9 UIU/ML (ref 0.3–5)
WBC OTHER # BLD: 6.3 K/UL (ref 3.5–11)

## 2023-06-07 PROCEDURE — 80053 COMPREHEN METABOLIC PANEL: CPT

## 2023-06-07 PROCEDURE — 83735 ASSAY OF MAGNESIUM: CPT

## 2023-06-07 PROCEDURE — 36415 COLL VENOUS BLD VENIPUNCTURE: CPT

## 2023-06-07 PROCEDURE — 84439 ASSAY OF FREE THYROXINE: CPT

## 2023-06-07 PROCEDURE — 85027 COMPLETE CBC AUTOMATED: CPT

## 2023-06-07 PROCEDURE — 84443 ASSAY THYROID STIM HORMONE: CPT

## 2023-06-19 ENCOUNTER — TELEPHONE (OUTPATIENT)
Dept: CARDIOLOGY CLINIC | Age: 73
End: 2023-06-19

## 2023-06-19 DIAGNOSIS — R06.02 SOB (SHORTNESS OF BREATH): ICD-10-CM

## 2023-06-19 DIAGNOSIS — I25.10 CORONARY ARTERY DISEASE INVOLVING NATIVE CORONARY ARTERY OF NATIVE HEART WITHOUT ANGINA PECTORIS: ICD-10-CM

## 2023-06-19 DIAGNOSIS — I50.9 CHRONIC CONGESTIVE HEART FAILURE, UNSPECIFIED HEART FAILURE TYPE (HCC): Primary | ICD-10-CM

## 2023-06-19 NOTE — TELEPHONE ENCOUNTER
----- Message from Kristan Sanz. sent at 6/19/2023  9:34 AM EDT -----  Regarding: Cardiac Rehab  Contact: [phone]  Dr. Noah Zuñiga,    My father rKistan Sanz. has had 13 Physical Therapy sessions that resumed on 5/22/2023 after his second hospital admission from [facility]. Dad has gotten stronger and is eager to start Cardiac Rehab. He wants to go to [address] [place] [address]. Please advise on next steps and or referral to move forward. Physical Therapy has been through [facility] in [place] (phone # [phone]). Thank [facility] Daughter)  [phone]    You may contact myself or my father Kavin Nguyen at [phone].

## 2023-06-20 ENCOUNTER — TELEPHONE (OUTPATIENT)
Dept: CARDIOLOGY CLINIC | Age: 73
End: 2023-06-20

## 2023-06-20 RX ORDER — OMEPRAZOLE 40 MG/1
40 CAPSULE, DELAYED RELEASE ORAL
Qty: 30 CAPSULE | Refills: 11 | Status: SHIPPED | OUTPATIENT
Start: 2023-06-20

## 2023-06-20 RX ORDER — FAMOTIDINE 40 MG/1
40 TABLET, FILM COATED ORAL DAILY
Qty: 30 TABLET | Refills: 11 | Status: SHIPPED | OUTPATIENT
Start: 2023-06-20

## 2023-06-20 NOTE — TELEPHONE ENCOUNTER
Isabel Roth left a message to state that he is having some pain in the sternum and up. He feels it is indigestion. He is asking what kind of medication would Dr. Gabriel Sauceda recommend. He stated that he has not addressed this with his family doctor. He stated that his prescriptions go to [facility].    His daughter, Marcelo Cuevas called to let Dr. Gabriel Sauceda know that Isabel Roth has gained about 4-5 lbs in the last 5 days. She was afraid that her dad had forgotten to disclose that. Please advise.

## 2023-06-20 NOTE — TELEPHONE ENCOUNTER
Pt notified will give prilosec and pepcid (taking both)  Pt states \"not much edema\" \"a little more sob\"   Ralph Pollock states \"Im only up three pounds\"   \"I do feel like I am regressing instead of progressing   The last few day\"   Pt notified will get labs tomorrow and go from there.   PER DR Henry Spear

## 2023-06-21 LAB
BASOPHILS ABSOLUTE: 0.09 /ΜL
BASOPHILS RELATIVE PERCENT: 1.1 %
BUN BLDV-MCNC: 61 MG/DL
CALCIUM SERPL-MCNC: 9.5 MG/DL
CHLORIDE BLD-SCNC: 105 MMOL/L
CO2: 22 MMOL/L
CREAT SERPL-MCNC: 2.77 MG/DL
EGFR: 23
EOSINOPHILS ABSOLUTE: 0.13 /ΜL
EOSINOPHILS RELATIVE PERCENT: 1.5 %
GLUCOSE BLD-MCNC: 160 MG/DL
HCT VFR BLD CALC: 31.4 % (ref 41–53)
HEMOGLOBIN: 9.2 G/DL (ref 13.5–17.5)
LYMPHOCYTES ABSOLUTE: 1.13 /ΜL
LYMPHOCYTES RELATIVE PERCENT: 13.4 %
MCH RBC QN AUTO: 32.6 PG
MCHC RBC AUTO-ENTMCNC: 29.3 G/DL
MCV RBC AUTO: 111.3 FL
MONOCYTES ABSOLUTE: 0.77 /ΜL
MONOCYTES RELATIVE PERCENT: 9.2 %
NEUTROPHILS ABSOLUTE: 3.23 /ΜL
NEUTROPHILS RELATIVE PERCENT: 74.1 %
PDW BLD-RTO: 19.7 %
PLATELET # BLD: 296 K/ΜL
PMV BLD AUTO: 10.5 FL
POTASSIUM SERPL-SCNC: 4.4 MMOL/L
RBC # BLD: 2.82 10^6/ΜL
SODIUM BLD-SCNC: 135 MMOL/L
WBC # BLD: 8.41 10^3/ML

## 2023-06-22 ENCOUNTER — TELEPHONE (OUTPATIENT)
Dept: CARDIOLOGY CLINIC | Age: 73
End: 2023-06-22

## 2023-06-22 DIAGNOSIS — I50.9 CHRONIC CONGESTIVE HEART FAILURE, UNSPECIFIED HEART FAILURE TYPE (HCC): ICD-10-CM

## 2023-06-22 DIAGNOSIS — I50.9 CHRONIC CONGESTIVE HEART FAILURE, UNSPECIFIED HEART FAILURE TYPE (HCC): Primary | ICD-10-CM

## 2023-06-22 DIAGNOSIS — R06.02 SOB (SHORTNESS OF BREATH): ICD-10-CM

## 2023-06-22 RX ORDER — SPIRONOLACTONE 25 MG/1
12.5 TABLET ORAL EVERY OTHER DAY
Qty: 30 TABLET | Refills: 0 | Status: SHIPPED
Start: 2023-06-22

## 2023-06-22 NOTE — TELEPHONE ENCOUNTER
Pt called per Dr to take torsimide 20 mg daily and aldactone 25 mg 1/2 tab every other day  Bmp and cbc in 1 week

## 2023-06-26 RX ORDER — APIXABAN 5 MG/1
TABLET, FILM COATED ORAL
Qty: 180 TABLET | Refills: 3 | Status: SHIPPED | OUTPATIENT
Start: 2023-06-26

## 2023-07-05 DIAGNOSIS — I50.9 CHRONIC CONGESTIVE HEART FAILURE, UNSPECIFIED HEART FAILURE TYPE (HCC): ICD-10-CM

## 2023-07-05 DIAGNOSIS — D64.9 ANEMIA, UNSPECIFIED TYPE: Primary | ICD-10-CM

## 2023-07-05 DIAGNOSIS — I10 PRIMARY HYPERTENSION: ICD-10-CM

## 2023-07-11 RX ORDER — METOPROLOL SUCCINATE 25 MG/1
25 TABLET, EXTENDED RELEASE ORAL DAILY
Qty: 30 TABLET | Refills: 11 | Status: SHIPPED | OUTPATIENT
Start: 2023-07-11

## 2023-07-13 RX ORDER — FAMOTIDINE 40 MG/1
TABLET, FILM COATED ORAL
Qty: 30 TABLET | Refills: 11 | Status: SHIPPED | OUTPATIENT
Start: 2023-07-13

## 2023-07-13 RX ORDER — OMEPRAZOLE 40 MG/1
CAPSULE, DELAYED RELEASE ORAL
Qty: 30 CAPSULE | Refills: 11 | Status: SHIPPED | OUTPATIENT
Start: 2023-07-13

## 2023-07-21 RX ORDER — POTASSIUM CHLORIDE 1500 MG/1
20 TABLET, EXTENDED RELEASE ORAL DAILY
Qty: 30 TABLET | Refills: 11 | Status: SHIPPED | OUTPATIENT
Start: 2023-07-21